# Patient Record
Sex: MALE | Race: BLACK OR AFRICAN AMERICAN | NOT HISPANIC OR LATINO | Employment: OTHER | URBAN - METROPOLITAN AREA
[De-identification: names, ages, dates, MRNs, and addresses within clinical notes are randomized per-mention and may not be internally consistent; named-entity substitution may affect disease eponyms.]

---

## 2020-01-01 ENCOUNTER — HOSPITAL ENCOUNTER (INPATIENT)
Facility: HOSPITAL | Age: 60
LOS: 6 days | DRG: 871 | End: 2020-04-16
Attending: EMERGENCY MEDICINE | Admitting: EMERGENCY MEDICINE
Payer: MEDICARE

## 2020-01-01 VITALS
SYSTOLIC BLOOD PRESSURE: 69 MMHG | TEMPERATURE: 98 F | WEIGHT: 188.25 LBS | DIASTOLIC BLOOD PRESSURE: 41 MMHG | BODY MASS INDEX: 26.95 KG/M2 | HEIGHT: 70 IN | OXYGEN SATURATION: 54 %

## 2020-01-01 DIAGNOSIS — I95.9 HYPOTENSION: ICD-10-CM

## 2020-01-01 DIAGNOSIS — I49.9 ARRHYTHMIA: ICD-10-CM

## 2020-01-01 DIAGNOSIS — U07.1 COVID-19 VIRUS INFECTION: Primary | ICD-10-CM

## 2020-01-01 DIAGNOSIS — R94.31 QT PROLONGATION: ICD-10-CM

## 2020-01-01 DIAGNOSIS — U07.1 COVID-19: ICD-10-CM

## 2020-01-01 DIAGNOSIS — R09.02 HYPOXIA: ICD-10-CM

## 2020-01-01 DIAGNOSIS — R78.81 BACTEREMIA: ICD-10-CM

## 2020-01-01 LAB
ALBUMIN SERPL BCP-MCNC: 1.2 G/DL (ref 3.5–5.2)
ALBUMIN SERPL BCP-MCNC: 1.2 G/DL (ref 3.5–5.2)
ALBUMIN SERPL BCP-MCNC: 1.5 G/DL (ref 3.5–5.2)
ALBUMIN SERPL BCP-MCNC: 1.7 G/DL (ref 3.5–5.2)
ALBUMIN SERPL BCP-MCNC: 2.2 G/DL (ref 3.5–5.2)
ALBUMIN SERPL BCP-MCNC: 2.5 G/DL (ref 3.5–5.2)
ALBUMIN SERPL BCP-MCNC: 2.9 G/DL (ref 3.5–5.2)
ALLENS TEST: ABNORMAL
ALP SERPL-CCNC: 44 U/L (ref 55–135)
ALP SERPL-CCNC: 46 U/L (ref 55–135)
ALP SERPL-CCNC: 47 U/L (ref 55–135)
ALP SERPL-CCNC: 47 U/L (ref 55–135)
ALP SERPL-CCNC: 48 U/L (ref 55–135)
ALP SERPL-CCNC: 54 U/L (ref 55–135)
ALP SERPL-CCNC: 58 U/L (ref 55–135)
ALT SERPL W/O P-5'-P-CCNC: 30 U/L (ref 10–44)
ALT SERPL W/O P-5'-P-CCNC: 31 U/L (ref 10–44)
ALT SERPL W/O P-5'-P-CCNC: 32 U/L (ref 10–44)
ALT SERPL W/O P-5'-P-CCNC: 34 U/L (ref 10–44)
ALT SERPL W/O P-5'-P-CCNC: 36 U/L (ref 10–44)
ALT SERPL W/O P-5'-P-CCNC: 40 U/L (ref 10–44)
ALT SERPL W/O P-5'-P-CCNC: 44 U/L (ref 10–44)
ANION GAP SERPL CALC-SCNC: 11 MMOL/L (ref 8–16)
ANION GAP SERPL CALC-SCNC: 11 MMOL/L (ref 8–16)
ANION GAP SERPL CALC-SCNC: 13 MMOL/L (ref 8–16)
ANION GAP SERPL CALC-SCNC: 9 MMOL/L (ref 8–16)
ANISOCYTOSIS BLD QL SMEAR: SLIGHT
AORTIC ROOT ANNULUS: 3.44 CM
AORTIC VALVE CUSP SEPERATION: 2.22 CM
ASCENDING AORTA: 1.83 CM
AST SERPL-CCNC: 20 U/L (ref 10–40)
AST SERPL-CCNC: 38 U/L (ref 10–40)
AST SERPL-CCNC: 48 U/L (ref 10–40)
AST SERPL-CCNC: 53 U/L (ref 10–40)
AST SERPL-CCNC: 70 U/L (ref 10–40)
AST SERPL-CCNC: 71 U/L (ref 10–40)
AST SERPL-CCNC: 72 U/L (ref 10–40)
AV PEAK GRADIENT: 5 MMHG
AV VELOCITY RATIO: 0.77
BACTERIA #/AREA URNS HPF: ABNORMAL /HPF
BACTERIA BLD CULT: ABNORMAL
BACTERIA BLD CULT: NORMAL
BACTERIA BLD CULT: NORMAL
BASO STIPL BLD QL SMEAR: ABNORMAL
BASOPHILS # BLD AUTO: 0.01 K/UL (ref 0–0.2)
BASOPHILS # BLD AUTO: 0.01 K/UL (ref 0–0.2)
BASOPHILS # BLD AUTO: 0.03 K/UL (ref 0–0.2)
BASOPHILS # BLD AUTO: ABNORMAL K/UL (ref 0–0.2)
BASOPHILS NFR BLD: 0 % (ref 0–1.9)
BASOPHILS NFR BLD: 0.1 % (ref 0–1.9)
BASOPHILS NFR BLD: 0.1 % (ref 0–1.9)
BASOPHILS NFR BLD: 0.3 % (ref 0–1.9)
BILIRUB SERPL-MCNC: 0.2 MG/DL (ref 0.1–1)
BILIRUB SERPL-MCNC: 0.4 MG/DL (ref 0.1–1)
BILIRUB UR QL STRIP: NEGATIVE
BNP SERPL-MCNC: 563 PG/ML (ref 0–99)
BUN SERPL-MCNC: 16 MG/DL (ref 8–23)
BUN SERPL-MCNC: 17 MG/DL (ref 8–23)
BUN SERPL-MCNC: 19 MG/DL (ref 8–23)
BUN SERPL-MCNC: 21 MG/DL (ref 8–23)
BUN SERPL-MCNC: 21 MG/DL (ref 8–23)
BUN SERPL-MCNC: 25 MG/DL (ref 8–23)
BUN SERPL-MCNC: 28 MG/DL (ref 8–23)
BUN SERPL-MCNC: 30 MG/DL (ref 8–23)
BURR CELLS BLD QL SMEAR: ABNORMAL
CA-I BLDV-SCNC: 0.89 MMOL/L (ref 1.06–1.42)
CA-I BLDV-SCNC: 0.98 MMOL/L (ref 1.06–1.42)
CA-I BLDV-SCNC: 0.98 MMOL/L (ref 1.06–1.42)
CALCIUM SERPL-MCNC: 7 MG/DL (ref 8.7–10.5)
CALCIUM SERPL-MCNC: 7.3 MG/DL (ref 8.7–10.5)
CALCIUM SERPL-MCNC: 7.5 MG/DL (ref 8.7–10.5)
CALCIUM SERPL-MCNC: 7.6 MG/DL (ref 8.7–10.5)
CALCIUM SERPL-MCNC: 7.7 MG/DL (ref 8.7–10.5)
CALCIUM SERPL-MCNC: 8.2 MG/DL (ref 8.7–10.5)
CHLORIDE SERPL-SCNC: 104 MMOL/L (ref 95–110)
CHLORIDE SERPL-SCNC: 105 MMOL/L (ref 95–110)
CHLORIDE SERPL-SCNC: 105 MMOL/L (ref 95–110)
CHLORIDE SERPL-SCNC: 106 MMOL/L (ref 95–110)
CHLORIDE SERPL-SCNC: 107 MMOL/L (ref 95–110)
CHLORIDE SERPL-SCNC: 110 MMOL/L (ref 95–110)
CK SERPL-CCNC: 267 U/L (ref 20–200)
CLARITY UR: CLEAR
CO2 SERPL-SCNC: 23 MMOL/L (ref 23–29)
CO2 SERPL-SCNC: 26 MMOL/L (ref 23–29)
CO2 SERPL-SCNC: 27 MMOL/L (ref 23–29)
CO2 SERPL-SCNC: 28 MMOL/L (ref 23–29)
COLOR UR: YELLOW
CREAT SERPL-MCNC: 0.9 MG/DL (ref 0.5–1.4)
CREAT SERPL-MCNC: 1.2 MG/DL (ref 0.5–1.4)
CREAT SERPL-MCNC: 1.3 MG/DL (ref 0.5–1.4)
CREAT SERPL-MCNC: 1.3 MG/DL (ref 0.5–1.4)
CRP SERPL-MCNC: 113.5 MG/L (ref 0–8.2)
CRP SERPL-MCNC: 294.2 MG/L (ref 0–8.2)
CRP SERPL-MCNC: 400.5 MG/L (ref 0–8.2)
CRP SERPL-MCNC: 429.9 MG/L (ref 0–8.2)
CV ECHO LV RWT: 0.39 CM
D DIMER PPP IA.FEU-MCNC: 2.21 MG/L FEU
DELSYS: ABNORMAL
DIFFERENTIAL METHOD: ABNORMAL
DOHLE BOD BLD QL SMEAR: PRESENT
DOHLE BOD BLD QL SMEAR: PRESENT
DOP CALC AO PEAK VEL: 1.15 M/S
DOP CALC LVOT AREA: 3 CM2
DOP CALC LVOT DIAMETER: 1.95 CM
DOP CALC LVOT PEAK VEL: 0.88 M/S
DOP CALC LVOT STROKE VOLUME: 62.33 CM3
DOP CALCLVOT PEAK VEL VTI: 20.88 CM
E WAVE DECELERATION TIME: 242.4 MSEC
E/A RATIO: 1.71
ECHO LV POSTERIOR WALL: 0.94 CM (ref 0.6–1.1)
EOSINOPHIL # BLD AUTO: 0 K/UL (ref 0–0.5)
EOSINOPHIL # BLD AUTO: ABNORMAL K/UL (ref 0–0.5)
EOSINOPHIL NFR BLD: 0 % (ref 0–8)
EOSINOPHIL NFR BLD: 0.2 % (ref 0–8)
EP: 10
EP: 5
ERYTHROCYTE [DISTWIDTH] IN BLOOD BY AUTOMATED COUNT: 16.3 % (ref 11.5–14.5)
ERYTHROCYTE [DISTWIDTH] IN BLOOD BY AUTOMATED COUNT: 16.4 % (ref 11.5–14.5)
ERYTHROCYTE [DISTWIDTH] IN BLOOD BY AUTOMATED COUNT: 16.5 % (ref 11.5–14.5)
ERYTHROCYTE [DISTWIDTH] IN BLOOD BY AUTOMATED COUNT: 17 % (ref 11.5–14.5)
ERYTHROCYTE [DISTWIDTH] IN BLOOD BY AUTOMATED COUNT: 17.1 % (ref 11.5–14.5)
ERYTHROCYTE [DISTWIDTH] IN BLOOD BY AUTOMATED COUNT: 17.1 % (ref 11.5–14.5)
ERYTHROCYTE [DISTWIDTH] IN BLOOD BY AUTOMATED COUNT: 17.2 % (ref 11.5–14.5)
ERYTHROCYTE [DISTWIDTH] IN BLOOD BY AUTOMATED COUNT: 17.9 % (ref 11.5–14.5)
ERYTHROCYTE [SEDIMENTATION RATE] IN BLOOD BY WESTERGREN METHOD: 12 MM/H
ERYTHROCYTE [SEDIMENTATION RATE] IN BLOOD BY WESTERGREN METHOD: 60 MM/HR (ref 0–10)
EST. GFR  (AFRICAN AMERICAN): >60 ML/MIN/1.73 M^2
EST. GFR  (NON AFRICAN AMERICAN): 58 ML/MIN/1.73 M^2
EST. GFR  (NON AFRICAN AMERICAN): 58.5 ML/MIN/1.73 M^2
EST. GFR  (NON AFRICAN AMERICAN): >60 ML/MIN/1.73 M^2
FERRITIN SERPL-MCNC: 1077 NG/ML (ref 20–300)
FERRITIN SERPL-MCNC: 1148 NG/ML (ref 20–300)
FERRITIN SERPL-MCNC: 1616 NG/ML (ref 20–300)
FERRITIN SERPL-MCNC: 940 NG/ML (ref 20–300)
FIO2: 100
FIO2: 80
FRACTIONAL SHORTENING: 27 % (ref 28–44)
GIANT PLATELETS BLD QL SMEAR: PRESENT
GLUCOSE SERPL-MCNC: 113 MG/DL (ref 70–110)
GLUCOSE SERPL-MCNC: 115 MG/DL (ref 70–110)
GLUCOSE SERPL-MCNC: 129 MG/DL (ref 70–110)
GLUCOSE SERPL-MCNC: 131 MG/DL (ref 70–110)
GLUCOSE SERPL-MCNC: 135 MG/DL (ref 70–110)
GLUCOSE SERPL-MCNC: 136 MG/DL (ref 70–110)
GLUCOSE SERPL-MCNC: 157 MG/DL (ref 70–110)
GLUCOSE SERPL-MCNC: 162 MG/DL (ref 70–110)
GLUCOSE UR QL STRIP: NEGATIVE
GLUCOSE-6-PHOSPHATE DEHYDROGENASE QUAL: NORMAL
HCO3 UR-SCNC: 26.6 MMOL/L (ref 24–28)
HCO3 UR-SCNC: 29.1 MMOL/L (ref 24–28)
HCO3 UR-SCNC: 30 MMOL/L (ref 24–28)
HCT VFR BLD AUTO: 29.7 % (ref 40–54)
HCT VFR BLD AUTO: 34.7 % (ref 40–54)
HCT VFR BLD AUTO: 35.8 % (ref 40–54)
HCT VFR BLD AUTO: 36.1 % (ref 40–54)
HCT VFR BLD AUTO: 36.6 % (ref 40–54)
HCT VFR BLD AUTO: 38.3 % (ref 40–54)
HCT VFR BLD AUTO: 38.5 % (ref 40–54)
HCT VFR BLD AUTO: 39.9 % (ref 40–54)
HGB BLD-MCNC: 10.7 G/DL (ref 14–18)
HGB BLD-MCNC: 11.2 G/DL (ref 14–18)
HGB BLD-MCNC: 11.3 G/DL (ref 14–18)
HGB BLD-MCNC: 11.4 G/DL (ref 14–18)
HGB BLD-MCNC: 11.8 G/DL (ref 14–18)
HGB BLD-MCNC: 11.9 G/DL (ref 14–18)
HGB BLD-MCNC: 12.1 G/DL (ref 14–18)
HGB BLD-MCNC: 9.3 G/DL (ref 14–18)
HGB UR QL STRIP: ABNORMAL
HYALINE CASTS #/AREA URNS LPF: 1 /LPF
HYPOCHROMIA BLD QL SMEAR: ABNORMAL
IMM GRANULOCYTES # BLD AUTO: 0.07 K/UL (ref 0–0.04)
IMM GRANULOCYTES # BLD AUTO: 0.11 K/UL (ref 0–0.04)
IMM GRANULOCYTES # BLD AUTO: 0.13 K/UL (ref 0–0.04)
IMM GRANULOCYTES # BLD AUTO: ABNORMAL K/UL (ref 0–0.04)
IMM GRANULOCYTES NFR BLD AUTO: 0.7 % (ref 0–0.5)
IMM GRANULOCYTES NFR BLD AUTO: 1.2 % (ref 0–0.5)
IMM GRANULOCYTES NFR BLD AUTO: 1.3 % (ref 0–0.5)
IMM GRANULOCYTES NFR BLD AUTO: ABNORMAL % (ref 0–0.5)
INTERVENTRICULAR SEPTUM: 1.02 CM (ref 0.6–1.1)
IP: 15
IP: 18
IVRT: 145.33 MSEC
KETONES UR QL STRIP: ABNORMAL
LA MAJOR: 4.68 CM
LA MINOR: 4.39 CM
LA WIDTH: 4.11 CM
LACTATE SERPL-SCNC: 1.4 MMOL/L (ref 0.5–2.2)
LEFT ATRIUM SIZE: 2.79 CM
LEFT ATRIUM VOLUME INDEX: 21.7 ML/M2
LEFT ATRIUM VOLUME: 44.16 CM3
LEFT INTERNAL DIMENSION IN SYSTOLE: 3.5 CM (ref 2.1–4)
LEFT VENTRICLE DIASTOLIC VOLUME INDEX: 52.41 ML/M2
LEFT VENTRICLE DIASTOLIC VOLUME: 106.62 ML
LEFT VENTRICLE MASS INDEX: 81 G/M2
LEFT VENTRICLE SYSTOLIC VOLUME INDEX: 25.1 ML/M2
LEFT VENTRICLE SYSTOLIC VOLUME: 50.99 ML
LEFT VENTRICULAR INTERNAL DIMENSION IN DIASTOLE: 4.78 CM (ref 3.5–6)
LEFT VENTRICULAR MASS: 164.48 G
LEUKOCYTE ESTERASE UR QL STRIP: NEGATIVE
LYMPHOCYTES # BLD AUTO: 0.5 K/UL (ref 1–4.8)
LYMPHOCYTES # BLD AUTO: 0.6 K/UL (ref 1–4.8)
LYMPHOCYTES # BLD AUTO: 1.2 K/UL (ref 1–4.8)
LYMPHOCYTES # BLD AUTO: ABNORMAL K/UL (ref 1–4.8)
LYMPHOCYTES NFR BLD: 0 % (ref 18–48)
LYMPHOCYTES NFR BLD: 12.3 % (ref 18–48)
LYMPHOCYTES NFR BLD: 2 % (ref 18–48)
LYMPHOCYTES NFR BLD: 3 % (ref 18–48)
LYMPHOCYTES NFR BLD: 4.3 % (ref 18–48)
LYMPHOCYTES NFR BLD: 5 % (ref 18–48)
LYMPHOCYTES NFR BLD: 6.3 % (ref 18–48)
LYMPHOCYTES NFR BLD: 8 % (ref 18–48)
MAGNESIUM SERPL-MCNC: 1.9 MG/DL (ref 1.6–2.6)
MAGNESIUM SERPL-MCNC: 2.1 MG/DL (ref 1.6–2.6)
MAGNESIUM SERPL-MCNC: 2.1 MG/DL (ref 1.6–2.6)
MAGNESIUM SERPL-MCNC: 2.3 MG/DL (ref 1.6–2.6)
MAGNESIUM SERPL-MCNC: 2.5 MG/DL (ref 1.6–2.6)
MAGNESIUM SERPL-MCNC: 2.7 MG/DL (ref 1.6–2.6)
MCH RBC QN AUTO: 25.9 PG (ref 27–31)
MCH RBC QN AUTO: 26 PG (ref 27–31)
MCH RBC QN AUTO: 26.2 PG (ref 27–31)
MCH RBC QN AUTO: 26.3 PG (ref 27–31)
MCH RBC QN AUTO: 26.3 PG (ref 27–31)
MCH RBC QN AUTO: 26.4 PG (ref 27–31)
MCH RBC QN AUTO: 26.5 PG (ref 27–31)
MCH RBC QN AUTO: 26.7 PG (ref 27–31)
MCHC RBC AUTO-ENTMCNC: 30.3 G/DL (ref 32–36)
MCHC RBC AUTO-ENTMCNC: 30.6 G/DL (ref 32–36)
MCHC RBC AUTO-ENTMCNC: 30.8 G/DL (ref 32–36)
MCHC RBC AUTO-ENTMCNC: 30.9 G/DL (ref 32–36)
MCHC RBC AUTO-ENTMCNC: 31 G/DL (ref 32–36)
MCHC RBC AUTO-ENTMCNC: 31.1 G/DL (ref 32–36)
MCHC RBC AUTO-ENTMCNC: 31.3 G/DL (ref 32–36)
MCHC RBC AUTO-ENTMCNC: 31.8 G/DL (ref 32–36)
MCV RBC AUTO: 84 FL (ref 82–98)
MCV RBC AUTO: 84 FL (ref 82–98)
MCV RBC AUTO: 85 FL (ref 82–98)
MCV RBC AUTO: 86 FL (ref 82–98)
METAMYELOCYTES NFR BLD MANUAL: 1 %
METAMYELOCYTES NFR BLD MANUAL: 1.5 %
METAMYELOCYTES NFR BLD MANUAL: 2 %
MICROSCOPIC COMMENT: ABNORMAL
MODE: ABNORMAL
MODE: ABNORMAL
MONOCYTES # BLD AUTO: 0.5 K/UL (ref 0.3–1)
MONOCYTES # BLD AUTO: 0.9 K/UL (ref 0.3–1)
MONOCYTES # BLD AUTO: 1.1 K/UL (ref 0.3–1)
MONOCYTES # BLD AUTO: ABNORMAL K/UL (ref 0.3–1)
MONOCYTES NFR BLD: 0 % (ref 4–15)
MONOCYTES NFR BLD: 0 % (ref 4–15)
MONOCYTES NFR BLD: 1 % (ref 4–15)
MONOCYTES NFR BLD: 10.4 % (ref 4–15)
MONOCYTES NFR BLD: 12 % (ref 4–15)
MONOCYTES NFR BLD: 2 % (ref 4–15)
MONOCYTES NFR BLD: 5 % (ref 4–15)
MONOCYTES NFR BLD: 5.2 % (ref 4–15)
MV PEAK A VEL: 0.52 M/S
MV PEAK E VEL: 0.89 M/S
MYELOCYTES NFR BLD MANUAL: 0.5 %
MYELOCYTES NFR BLD MANUAL: 3 %
MYELOCYTES NFR BLD MANUAL: 3 %
NEUTROPHILS # BLD AUTO: 7 K/UL (ref 1.8–7.7)
NEUTROPHILS # BLD AUTO: 7.1 K/UL (ref 1.8–7.7)
NEUTROPHILS # BLD AUTO: 9.3 K/UL (ref 1.8–7.7)
NEUTROPHILS NFR BLD: 74.7 % (ref 38–73)
NEUTROPHILS NFR BLD: 75 % (ref 38–73)
NEUTROPHILS NFR BLD: 77.5 % (ref 38–73)
NEUTROPHILS NFR BLD: 79 % (ref 38–73)
NEUTROPHILS NFR BLD: 81.9 % (ref 38–73)
NEUTROPHILS NFR BLD: 83 % (ref 38–73)
NEUTROPHILS NFR BLD: 89 % (ref 38–73)
NEUTROPHILS NFR BLD: 90 % (ref 38–73)
NEUTS BAND NFR BLD MANUAL: 17 %
NEUTS BAND NFR BLD MANUAL: 17 %
NEUTS BAND NFR BLD MANUAL: 18.5 %
NEUTS BAND NFR BLD MANUAL: 6 %
NITRITE UR QL STRIP: NEGATIVE
NRBC BLD-RTO: 0 /100 WBC
OVALOCYTES BLD QL SMEAR: ABNORMAL
PCO2 BLDA: 35.1 MMHG (ref 35–45)
PCO2 BLDA: 42 MMHG (ref 35–45)
PCO2 BLDA: 46.8 MMHG (ref 35–45)
PH SMN: 7.4 [PH] (ref 7.35–7.45)
PH SMN: 7.41 [PH] (ref 7.35–7.45)
PH SMN: 7.54 [PH] (ref 7.35–7.45)
PH UR STRIP: 5 [PH] (ref 5–8)
PHOSPHATE SERPL-MCNC: 2.8 MG/DL (ref 2.7–4.5)
PHOSPHATE SERPL-MCNC: 3.3 MG/DL (ref 2.7–4.5)
PHOSPHATE SERPL-MCNC: 3.4 MG/DL (ref 2.7–4.5)
PHOSPHATE SERPL-MCNC: 3.7 MG/DL (ref 2.7–4.5)
PHOSPHATE SERPL-MCNC: 4.6 MG/DL (ref 2.7–4.5)
PHOSPHATE SERPL-MCNC: 5.5 MG/DL (ref 2.7–4.5)
PISA TR MAX VEL: 2.28 M/S
PLATELET # BLD AUTO: 155 K/UL (ref 150–350)
PLATELET # BLD AUTO: 165 K/UL (ref 150–350)
PLATELET # BLD AUTO: 175 K/UL (ref 150–350)
PLATELET # BLD AUTO: 208 K/UL (ref 150–350)
PLATELET # BLD AUTO: 243 K/UL (ref 150–350)
PLATELET # BLD AUTO: 246 K/UL (ref 150–350)
PLATELET # BLD AUTO: 257 K/UL (ref 150–350)
PLATELET # BLD AUTO: 265 K/UL (ref 150–350)
PLATELET BLD QL SMEAR: ABNORMAL
PLATELET BLD QL SMEAR: ABNORMAL
PMV BLD AUTO: 11.7 FL (ref 9.2–12.9)
PMV BLD AUTO: 11.7 FL (ref 9.2–12.9)
PMV BLD AUTO: 11.8 FL (ref 9.2–12.9)
PMV BLD AUTO: 11.9 FL (ref 9.2–12.9)
PMV BLD AUTO: 11.9 FL (ref 9.2–12.9)
PMV BLD AUTO: 12.1 FL (ref 9.2–12.9)
PMV BLD AUTO: 12.2 FL (ref 9.2–12.9)
PMV BLD AUTO: 12.9 FL (ref 9.2–12.9)
PO2 BLDA: 44 MMHG (ref 80–100)
PO2 BLDA: 45 MMHG (ref 40–60)
PO2 BLDA: 57 MMHG (ref 80–100)
POC BE: 2 MMOL/L
POC BE: 4 MMOL/L
POC BE: 7 MMOL/L
POC SATURATED O2: 79 % (ref 95–100)
POC SATURATED O2: 86 % (ref 95–100)
POC SATURATED O2: 89 % (ref 95–100)
POC TCO2: 28 MMOL/L (ref 23–27)
POC TCO2: 31 MMOL/L (ref 23–27)
POC TCO2: 31 MMOL/L (ref 24–29)
POIKILOCYTOSIS BLD QL SMEAR: ABNORMAL
POIKILOCYTOSIS BLD QL SMEAR: SLIGHT
POLYCHROMASIA BLD QL SMEAR: ABNORMAL
POTASSIUM SERPL-SCNC: 3.9 MMOL/L (ref 3.5–5.1)
POTASSIUM SERPL-SCNC: 4 MMOL/L (ref 3.5–5.1)
POTASSIUM SERPL-SCNC: 4.1 MMOL/L (ref 3.5–5.1)
POTASSIUM SERPL-SCNC: 4.1 MMOL/L (ref 3.5–5.1)
POTASSIUM SERPL-SCNC: 4.3 MMOL/L (ref 3.5–5.1)
PROCALCITONIN SERPL IA-MCNC: 0.2 NG/ML
PROT SERPL-MCNC: 5.9 G/DL (ref 6–8.4)
PROT SERPL-MCNC: 6.3 G/DL (ref 6–8.4)
PROT SERPL-MCNC: 6.4 G/DL (ref 6–8.4)
PROT SERPL-MCNC: 6.4 G/DL (ref 6–8.4)
PROT SERPL-MCNC: 6.5 G/DL (ref 6–8.4)
PROT SERPL-MCNC: 6.9 G/DL (ref 6–8.4)
PROT SERPL-MCNC: 6.9 G/DL (ref 6–8.4)
PROT UR QL STRIP: ABNORMAL
PULM VEIN S/D RATIO: 1.54
PV PEAK D VEL: 0.26 M/S
PV PEAK S VEL: 0.4 M/S
PV PEAK VELOCITY: 0.61 CM/S
RA MAJOR: 5.11 CM
RA PRESSURE: 3 MMHG
RA WIDTH: 4.21 CM
RBC # BLD AUTO: 3.52 M/UL (ref 4.6–6.2)
RBC # BLD AUTO: 4.04 M/UL (ref 4.6–6.2)
RBC # BLD AUTO: 4.27 M/UL (ref 4.6–6.2)
RBC # BLD AUTO: 4.27 M/UL (ref 4.6–6.2)
RBC # BLD AUTO: 4.29 M/UL (ref 4.6–6.2)
RBC # BLD AUTO: 4.53 M/UL (ref 4.6–6.2)
RBC # BLD AUTO: 4.53 M/UL (ref 4.6–6.2)
RBC # BLD AUTO: 4.68 M/UL (ref 4.6–6.2)
RBC #/AREA URNS HPF: 1 /HPF (ref 0–4)
RIGHT VENTRICULAR END-DIASTOLIC DIMENSION: 4.35 CM
SAMPLE: ABNORMAL
SARS-COV-2 RDRP RESP QL NAA+PROBE: POSITIVE
SINUS: 2.53 CM
SITE: ABNORMAL
SODIUM SERPL-SCNC: 141 MMOL/L (ref 136–145)
SODIUM SERPL-SCNC: 141 MMOL/L (ref 136–145)
SODIUM SERPL-SCNC: 142 MMOL/L (ref 136–145)
SODIUM SERPL-SCNC: 142 MMOL/L (ref 136–145)
SODIUM SERPL-SCNC: 144 MMOL/L (ref 136–145)
SODIUM SERPL-SCNC: 147 MMOL/L (ref 136–145)
SP GR UR STRIP: 1.03 (ref 1–1.03)
SP02: 92
SPONT RATE: 30
SPONT RATE: 42
SQUAMOUS #/AREA URNS HPF: 3 /HPF
STJ: 2.32 CM
TOXIC GRANULES BLD QL SMEAR: PRESENT
TR MAX PG: 21 MMHG
TRICUSPID ANNULAR PLANE SYSTOLIC EXCURSION: 1.9 CM
TROPONIN I SERPL DL<=0.01 NG/ML-MCNC: 0.03 NG/ML (ref 0–0.03)
TROPONIN I SERPL DL<=0.01 NG/ML-MCNC: 0.07 NG/ML (ref 0–0.03)
TROPONIN I SERPL DL<=0.01 NG/ML-MCNC: 0.08 NG/ML (ref 0–0.03)
TV REST PULMONARY ARTERY PRESSURE: 24 MMHG
URN SPEC COLLECT METH UR: ABNORMAL
UROBILINOGEN UR STRIP-ACNC: NEGATIVE EU/DL
VANCOMYCIN TROUGH SERPL-MCNC: 6.8 UG/ML (ref 10–22)
WBC # BLD AUTO: 10.45 K/UL (ref 3.9–12.7)
WBC # BLD AUTO: 16.31 K/UL (ref 3.9–12.7)
WBC # BLD AUTO: 17.32 K/UL (ref 3.9–12.7)
WBC # BLD AUTO: 18.45 K/UL (ref 3.9–12.7)
WBC # BLD AUTO: 21.1 K/UL (ref 3.9–12.7)
WBC # BLD AUTO: 22.08 K/UL (ref 3.9–12.7)
WBC # BLD AUTO: 8.72 K/UL (ref 3.9–12.7)
WBC # BLD AUTO: 9.38 K/UL (ref 3.9–12.7)
WBC #/AREA URNS HPF: 4 /HPF (ref 0–5)
WBC TOXIC VACUOLES BLD QL SMEAR: PRESENT
WBC TOXIC VACUOLES BLD QL SMEAR: PRESENT

## 2020-01-01 PROCEDURE — 25000003 PHARM REV CODE 250: Performed by: INTERNAL MEDICINE

## 2020-01-01 PROCEDURE — A4216 STERILE WATER/SALINE, 10 ML: HCPCS | Performed by: HOSPITALIST

## 2020-01-01 PROCEDURE — 25000003 PHARM REV CODE 250: Performed by: GENERAL ACUTE CARE HOSPITAL

## 2020-01-01 PROCEDURE — 99291 CRITICAL CARE FIRST HOUR: CPT | Mod: ,,, | Performed by: INTERNAL MEDICINE

## 2020-01-01 PROCEDURE — 83880 ASSAY OF NATRIURETIC PEPTIDE: CPT

## 2020-01-01 PROCEDURE — 63600175 PHARM REV CODE 636 W HCPCS: Performed by: NURSE PRACTITIONER

## 2020-01-01 PROCEDURE — 25000003 PHARM REV CODE 250: Performed by: NURSE PRACTITIONER

## 2020-01-01 PROCEDURE — 86140 C-REACTIVE PROTEIN: CPT

## 2020-01-01 PROCEDURE — 63600175 PHARM REV CODE 636 W HCPCS: Performed by: INTERNAL MEDICINE

## 2020-01-01 PROCEDURE — 84100 ASSAY OF PHOSPHORUS: CPT

## 2020-01-01 PROCEDURE — 80053 COMPREHEN METABOLIC PANEL: CPT

## 2020-01-01 PROCEDURE — 82728 ASSAY OF FERRITIN: CPT

## 2020-01-01 PROCEDURE — 25000003 PHARM REV CODE 250: Performed by: EMERGENCY MEDICINE

## 2020-01-01 PROCEDURE — 99291 PR CRITICAL CARE, E/M 30-74 MINUTES: ICD-10-PCS | Mod: ,,, | Performed by: INTERNAL MEDICINE

## 2020-01-01 PROCEDURE — 63600175 PHARM REV CODE 636 W HCPCS: Performed by: PHYSICIAN ASSISTANT

## 2020-01-01 PROCEDURE — 94761 N-INVAS EAR/PLS OXIMETRY MLT: CPT

## 2020-01-01 PROCEDURE — 20000000 HC ICU ROOM

## 2020-01-01 PROCEDURE — 25000003 PHARM REV CODE 250: Performed by: PHYSICIAN ASSISTANT

## 2020-01-01 PROCEDURE — 99900035 HC TECH TIME PER 15 MIN (STAT)

## 2020-01-01 PROCEDURE — 27000221 HC OXYGEN, UP TO 24 HOURS

## 2020-01-01 PROCEDURE — 94660 CPAP INITIATION&MGMT: CPT

## 2020-01-01 PROCEDURE — 87040 BLOOD CULTURE FOR BACTERIA: CPT

## 2020-01-01 PROCEDURE — 36415 COLL VENOUS BLD VENIPUNCTURE: CPT

## 2020-01-01 PROCEDURE — 99292 CRITICAL CARE ADDL 30 MIN: CPT | Mod: ,,, | Performed by: NURSE PRACTITIONER

## 2020-01-01 PROCEDURE — 93005 ELECTROCARDIOGRAM TRACING: CPT

## 2020-01-01 PROCEDURE — 87040 BLOOD CULTURE FOR BACTERIA: CPT | Mod: 59

## 2020-01-01 PROCEDURE — 80053 COMPREHEN METABOLIC PANEL: CPT | Mod: 91

## 2020-01-01 PROCEDURE — 85027 COMPLETE CBC AUTOMATED: CPT | Mod: 91

## 2020-01-01 PROCEDURE — 82960 TEST FOR G6PD ENZYME: CPT

## 2020-01-01 PROCEDURE — 85027 COMPLETE CBC AUTOMATED: CPT

## 2020-01-01 PROCEDURE — 43752 NASAL/OROGASTRIC W/TUBE PLMT: CPT

## 2020-01-01 PROCEDURE — 99223 1ST HOSP IP/OBS HIGH 75: CPT | Mod: ,,, | Performed by: INTERNAL MEDICINE

## 2020-01-01 PROCEDURE — 83735 ASSAY OF MAGNESIUM: CPT

## 2020-01-01 PROCEDURE — 27100092 HC HIGH FLOW DELIVERY CANNULA

## 2020-01-01 PROCEDURE — 82550 ASSAY OF CK (CPK): CPT

## 2020-01-01 PROCEDURE — 27000190 HC CPAP FULL FACE MASK W/VALVE

## 2020-01-01 PROCEDURE — 63700000 PHARM REV CODE 250 ALT 637 W/O HCPCS: Performed by: NURSE PRACTITIONER

## 2020-01-01 PROCEDURE — 11000001 HC ACUTE MED/SURG PRIVATE ROOM

## 2020-01-01 PROCEDURE — 93010 EKG 12-LEAD: ICD-10-PCS | Mod: ,,, | Performed by: INTERNAL MEDICINE

## 2020-01-01 PROCEDURE — 85025 COMPLETE CBC W/AUTO DIFF WBC: CPT

## 2020-01-01 PROCEDURE — 84484 ASSAY OF TROPONIN QUANT: CPT

## 2020-01-01 PROCEDURE — 83735 ASSAY OF MAGNESIUM: CPT | Mod: 91

## 2020-01-01 PROCEDURE — C1751 CATH, INF, PER/CENT/MIDLINE: HCPCS

## 2020-01-01 PROCEDURE — 81000 URINALYSIS NONAUTO W/SCOPE: CPT

## 2020-01-01 PROCEDURE — 27100171 HC OXYGEN HIGH FLOW UP TO 24 HOURS

## 2020-01-01 PROCEDURE — 99233 PR SUBSEQUENT HOSPITAL CARE,LEVL III: ICD-10-PCS | Mod: ,,, | Performed by: INTERNAL MEDICINE

## 2020-01-01 PROCEDURE — 63600175 PHARM REV CODE 636 W HCPCS: Performed by: PHARMACIST

## 2020-01-01 PROCEDURE — 87186 SC STD MICRODIL/AGAR DIL: CPT

## 2020-01-01 PROCEDURE — 93010 ELECTROCARDIOGRAM REPORT: CPT | Mod: ,,, | Performed by: INTERNAL MEDICINE

## 2020-01-01 PROCEDURE — 80202 ASSAY OF VANCOMYCIN: CPT

## 2020-01-01 PROCEDURE — 84100 ASSAY OF PHOSPHORUS: CPT | Mod: 91

## 2020-01-01 PROCEDURE — 96361 HYDRATE IV INFUSION ADD-ON: CPT

## 2020-01-01 PROCEDURE — 87077 CULTURE AEROBIC IDENTIFY: CPT

## 2020-01-01 PROCEDURE — S0166 INJ OLANZAPINE 2.5MG: HCPCS | Performed by: PHYSICIAN ASSISTANT

## 2020-01-01 PROCEDURE — 96360 HYDRATION IV INFUSION INIT: CPT

## 2020-01-01 PROCEDURE — 99223 PR INITIAL HOSPITAL CARE,LEVL III: ICD-10-PCS | Mod: ,,, | Performed by: INTERNAL MEDICINE

## 2020-01-01 PROCEDURE — 25000003 PHARM REV CODE 250: Performed by: HOSPITALIST

## 2020-01-01 PROCEDURE — 63600175 PHARM REV CODE 636 W HCPCS: Mod: JG | Performed by: INTERNAL MEDICINE

## 2020-01-01 PROCEDURE — 63600175 PHARM REV CODE 636 W HCPCS: Performed by: GENERAL ACUTE CARE HOSPITAL

## 2020-01-01 PROCEDURE — 84145 PROCALCITONIN (PCT): CPT

## 2020-01-01 PROCEDURE — 82330 ASSAY OF CALCIUM: CPT

## 2020-01-01 PROCEDURE — 63600175 PHARM REV CODE 636 W HCPCS: Performed by: EMERGENCY MEDICINE

## 2020-01-01 PROCEDURE — 99285 EMERGENCY DEPT VISIT HI MDM: CPT | Mod: 25

## 2020-01-01 PROCEDURE — 82803 BLOOD GASES ANY COMBINATION: CPT

## 2020-01-01 PROCEDURE — 51702 INSERT TEMP BLADDER CATH: CPT

## 2020-01-01 PROCEDURE — 85007 BL SMEAR W/DIFF WBC COUNT: CPT

## 2020-01-01 PROCEDURE — U0002 COVID-19 LAB TEST NON-CDC: HCPCS

## 2020-01-01 PROCEDURE — 99292 PR CRITICAL CARE, ADDL 30 MIN: ICD-10-PCS | Mod: ,,, | Performed by: NURSE PRACTITIONER

## 2020-01-01 PROCEDURE — 36569 INSJ PICC 5 YR+ W/O IMAGING: CPT

## 2020-01-01 PROCEDURE — 99291 CRITICAL CARE FIRST HOUR: CPT | Mod: ,,, | Performed by: NURSE PRACTITIONER

## 2020-01-01 PROCEDURE — 85379 FIBRIN DEGRADATION QUANT: CPT

## 2020-01-01 PROCEDURE — 85652 RBC SED RATE AUTOMATED: CPT

## 2020-01-01 PROCEDURE — 83605 ASSAY OF LACTIC ACID: CPT

## 2020-01-01 PROCEDURE — 25000003 PHARM REV CODE 250: Performed by: STUDENT IN AN ORGANIZED HEALTH CARE EDUCATION/TRAINING PROGRAM

## 2020-01-01 PROCEDURE — 63700000 PHARM REV CODE 250 ALT 637 W/O HCPCS: Performed by: INTERNAL MEDICINE

## 2020-01-01 PROCEDURE — 36600 WITHDRAWAL OF ARTERIAL BLOOD: CPT

## 2020-01-01 PROCEDURE — 99291 PR CRITICAL CARE, E/M 30-74 MINUTES: ICD-10-PCS | Mod: ,,, | Performed by: NURSE PRACTITIONER

## 2020-01-01 PROCEDURE — 51701 INSERT BLADDER CATHETER: CPT

## 2020-01-01 PROCEDURE — 99233 SBSQ HOSP IP/OBS HIGH 50: CPT | Mod: ,,, | Performed by: INTERNAL MEDICINE

## 2020-01-01 PROCEDURE — 84484 ASSAY OF TROPONIN QUANT: CPT | Mod: 91

## 2020-01-01 PROCEDURE — 25000003 PHARM REV CODE 250: Performed by: PSYCHIATRY & NEUROLOGY

## 2020-01-01 PROCEDURE — 80048 BASIC METABOLIC PNL TOTAL CA: CPT

## 2020-01-01 PROCEDURE — 63700000 PHARM REV CODE 250 ALT 637 W/O HCPCS: Performed by: EMERGENCY MEDICINE

## 2020-01-01 PROCEDURE — 82800 BLOOD PH: CPT

## 2020-01-01 RX ORDER — ASPIRIN 81 MG/1
81 TABLET ORAL DAILY
Status: DISCONTINUED | OUTPATIENT
Start: 2020-01-01 | End: 2020-01-01

## 2020-01-01 RX ORDER — CHLORHEXIDINE GLUCONATE ORAL RINSE 1.2 MG/ML
15 SOLUTION DENTAL 2 TIMES DAILY
Status: DISCONTINUED | OUTPATIENT
Start: 2020-01-01 | End: 2020-01-01

## 2020-01-01 RX ORDER — AZITHROMYCIN 250 MG/1
250 TABLET, FILM COATED ORAL DAILY
Status: COMPLETED | OUTPATIENT
Start: 2020-01-01 | End: 2020-01-01

## 2020-01-01 RX ORDER — LORAZEPAM 0.5 MG/1
1 TABLET ORAL EVERY 6 HOURS PRN
COMMUNITY

## 2020-01-01 RX ORDER — POTASSIUM CHLORIDE 20 MEQ/15ML
40 SOLUTION ORAL
Status: DISCONTINUED | OUTPATIENT
Start: 2020-01-01 | End: 2020-01-01 | Stop reason: HOSPADM

## 2020-01-01 RX ORDER — AMLODIPINE BESYLATE 10 MG/1
10 TABLET ORAL
Status: ON HOLD | COMMUNITY
End: 2020-01-01

## 2020-01-01 RX ORDER — FAMOTIDINE 20 MG/1
20 TABLET, FILM COATED ORAL DAILY
COMMUNITY
Start: 2020-01-01

## 2020-01-01 RX ORDER — LANOLIN ALCOHOL/MO/W.PET/CERES
800 CREAM (GRAM) TOPICAL
Status: DISCONTINUED | OUTPATIENT
Start: 2020-01-01 | End: 2020-01-01 | Stop reason: HOSPADM

## 2020-01-01 RX ORDER — MORPHINE SULFATE 2 MG/ML
2 INJECTION, SOLUTION INTRAMUSCULAR; INTRAVENOUS ONCE
Status: COMPLETED | OUTPATIENT
Start: 2020-01-01 | End: 2020-01-01

## 2020-01-01 RX ORDER — NOREPINEPHRINE BITARTRATE/D5W 4MG/250ML
0.02 PLASTIC BAG, INJECTION (ML) INTRAVENOUS CONTINUOUS
Status: DISCONTINUED | OUTPATIENT
Start: 2020-01-01 | End: 2020-01-01

## 2020-01-01 RX ORDER — ENOXAPARIN SODIUM 100 MG/ML
40 INJECTION SUBCUTANEOUS EVERY 24 HOURS
Status: DISCONTINUED | OUTPATIENT
Start: 2020-01-01 | End: 2020-01-01

## 2020-01-01 RX ORDER — ETOMIDATE 2 MG/ML
20 INJECTION INTRAVENOUS ONCE
Status: DISCONTINUED | OUTPATIENT
Start: 2020-01-01 | End: 2020-01-01

## 2020-01-01 RX ORDER — LISINOPRIL 5 MG/1
5 TABLET ORAL DAILY
COMMUNITY
Start: 2020-01-01

## 2020-01-01 RX ORDER — PROPOFOL 10 MG/ML
5 INJECTION, EMULSION INTRAVENOUS CONTINUOUS
Status: DISCONTINUED | OUTPATIENT
Start: 2020-01-01 | End: 2020-01-01

## 2020-01-01 RX ORDER — QUETIAPINE FUMARATE 50 MG/1
50 TABLET, FILM COATED ORAL NIGHTLY
COMMUNITY

## 2020-01-01 RX ORDER — FAMOTIDINE 20 MG/1
20 TABLET, FILM COATED ORAL EVERY 12 HOURS
Status: DISCONTINUED | OUTPATIENT
Start: 2020-01-01 | End: 2020-01-01

## 2020-01-01 RX ORDER — OLANZAPINE 10 MG/2ML
5 INJECTION, POWDER, FOR SOLUTION INTRAMUSCULAR ONCE AS NEEDED
Status: COMPLETED | OUTPATIENT
Start: 2020-01-01 | End: 2020-01-01

## 2020-01-01 RX ORDER — SODIUM CHLORIDE 0.9 % (FLUSH) 0.9 %
10 SYRINGE (ML) INJECTION
Status: DISCONTINUED | OUTPATIENT
Start: 2020-01-01 | End: 2020-01-01 | Stop reason: HOSPADM

## 2020-01-01 RX ORDER — HYDROXYCHLOROQUINE SULFATE 200 MG/1
400 TABLET, FILM COATED ORAL 2 TIMES DAILY
Status: COMPLETED | OUTPATIENT
Start: 2020-01-01 | End: 2020-01-01

## 2020-01-01 RX ORDER — SODIUM,POTASSIUM PHOSPHATES 280-250MG
2 POWDER IN PACKET (EA) ORAL
Status: DISCONTINUED | OUTPATIENT
Start: 2020-01-01 | End: 2020-01-01 | Stop reason: HOSPADM

## 2020-01-01 RX ORDER — HYDROXYCHLOROQUINE SULFATE 200 MG/1
400 TABLET, FILM COATED ORAL DAILY
Status: COMPLETED | OUTPATIENT
Start: 2020-01-01 | End: 2020-01-01

## 2020-01-01 RX ORDER — MEMANTINE HYDROCHLORIDE 5 MG/1
5 TABLET ORAL DAILY
Status: DISCONTINUED | OUTPATIENT
Start: 2020-01-01 | End: 2020-01-01

## 2020-01-01 RX ORDER — FAMOTIDINE 20 MG/1
20 TABLET, FILM COATED ORAL DAILY
Status: DISCONTINUED | OUTPATIENT
Start: 2020-01-01 | End: 2020-01-01

## 2020-01-01 RX ORDER — DEXMEDETOMIDINE HYDROCHLORIDE 4 UG/ML
0.2 INJECTION, SOLUTION INTRAVENOUS CONTINUOUS
Status: DISCONTINUED | OUTPATIENT
Start: 2020-01-01 | End: 2020-01-01 | Stop reason: HOSPADM

## 2020-01-01 RX ORDER — FUROSEMIDE 10 MG/ML
40 INJECTION INTRAMUSCULAR; INTRAVENOUS ONCE
Status: COMPLETED | OUTPATIENT
Start: 2020-01-01 | End: 2020-01-01

## 2020-01-01 RX ORDER — KETOROLAC TROMETHAMINE 30 MG/ML
15 INJECTION, SOLUTION INTRAMUSCULAR; INTRAVENOUS ONCE
Status: DISCONTINUED | OUTPATIENT
Start: 2020-01-01 | End: 2020-01-01

## 2020-01-01 RX ORDER — LORAZEPAM 2 MG/ML
INJECTION INTRAMUSCULAR
Status: DISPENSED
Start: 2020-01-01 | End: 2020-01-01

## 2020-01-01 RX ORDER — DIVALPROEX SODIUM 250 MG/1
500 TABLET, DELAYED RELEASE ORAL 2 TIMES DAILY
Status: DISCONTINUED | OUTPATIENT
Start: 2020-01-01 | End: 2020-01-01

## 2020-01-01 RX ORDER — ATORVASTATIN CALCIUM 20 MG/1
40 TABLET, FILM COATED ORAL DAILY
Status: DISCONTINUED | OUTPATIENT
Start: 2020-01-01 | End: 2020-01-01

## 2020-01-01 RX ORDER — MIRTAZAPINE 15 MG/1
15 TABLET, FILM COATED ORAL NIGHTLY
Status: DISCONTINUED | OUTPATIENT
Start: 2020-01-01 | End: 2020-01-01

## 2020-01-01 RX ORDER — AZITHROMYCIN 250 MG/1
500 TABLET, FILM COATED ORAL DAILY
Status: DISCONTINUED | OUTPATIENT
Start: 2020-01-01 | End: 2020-01-01

## 2020-01-01 RX ORDER — AMLODIPINE BESYLATE 5 MG/1
5 TABLET ORAL 2 TIMES DAILY
Status: DISCONTINUED | OUTPATIENT
Start: 2020-01-01 | End: 2020-01-01

## 2020-01-01 RX ORDER — MORPHINE SULFATE 1 MG/ML
2 INJECTION, SOLUTION INTRAVENOUS CONTINUOUS
Status: DISCONTINUED | OUTPATIENT
Start: 2020-01-01 | End: 2020-01-01 | Stop reason: HOSPADM

## 2020-01-01 RX ORDER — SUCCINYLCHOLINE CHLORIDE 20 MG/ML
1.5 INJECTION INTRAMUSCULAR; INTRAVENOUS ONCE
Status: DISCONTINUED | OUTPATIENT
Start: 2020-01-01 | End: 2020-01-01

## 2020-01-01 RX ORDER — LISINOPRIL 5 MG/1
5 TABLET ORAL DAILY
Status: DISCONTINUED | OUTPATIENT
Start: 2020-01-01 | End: 2020-01-01

## 2020-01-01 RX ORDER — HYDRALAZINE HYDROCHLORIDE 20 MG/ML
10 INJECTION INTRAMUSCULAR; INTRAVENOUS EVERY 4 HOURS PRN
Status: DISCONTINUED | OUTPATIENT
Start: 2020-01-01 | End: 2020-01-01

## 2020-01-01 RX ORDER — DIVALPROEX SODIUM 500 MG/1
500 TABLET, DELAYED RELEASE ORAL 2 TIMES DAILY
COMMUNITY
Start: 2020-01-01

## 2020-01-01 RX ORDER — ACETAMINOPHEN 650 MG/1
650 SUPPOSITORY RECTAL
Status: COMPLETED | OUTPATIENT
Start: 2020-01-01 | End: 2020-01-01

## 2020-01-01 RX ORDER — ACETAMINOPHEN 325 MG/1
650 TABLET ORAL EVERY 6 HOURS PRN
Status: DISCONTINUED | OUTPATIENT
Start: 2020-01-01 | End: 2020-01-01 | Stop reason: HOSPADM

## 2020-01-01 RX ORDER — QUETIAPINE FUMARATE 25 MG/1
50 TABLET, FILM COATED ORAL NIGHTLY
Status: DISCONTINUED | OUTPATIENT
Start: 2020-01-01 | End: 2020-01-01

## 2020-01-01 RX ORDER — LORAZEPAM 2 MG/ML
1 INJECTION INTRAMUSCULAR
Status: DISCONTINUED | OUTPATIENT
Start: 2020-01-01 | End: 2020-01-01 | Stop reason: HOSPADM

## 2020-01-01 RX ORDER — PROPOFOL 10 MG/ML
INJECTION, EMULSION INTRAVENOUS
Status: DISPENSED
Start: 2020-01-01 | End: 2020-01-01

## 2020-01-01 RX ORDER — ACETAMINOPHEN 500 MG
1000 TABLET ORAL
Status: ACTIVE | OUTPATIENT
Start: 2020-01-01 | End: 2020-01-01

## 2020-01-01 RX ORDER — LORAZEPAM 0.5 MG/1
1 TABLET ORAL EVERY 6 HOURS PRN
Status: DISCONTINUED | OUTPATIENT
Start: 2020-01-01 | End: 2020-01-01

## 2020-01-01 RX ORDER — ONDANSETRON 2 MG/ML
4 INJECTION INTRAMUSCULAR; INTRAVENOUS EVERY 8 HOURS PRN
Status: DISCONTINUED | OUTPATIENT
Start: 2020-01-01 | End: 2020-01-01 | Stop reason: HOSPADM

## 2020-01-01 RX ORDER — POLYETHYLENE GLYCOL 3350 17 G/17G
17 POWDER, FOR SOLUTION ORAL DAILY
Status: DISCONTINUED | OUTPATIENT
Start: 2020-01-01 | End: 2020-01-01

## 2020-01-01 RX ORDER — MEMANTINE HYDROCHLORIDE 10 MG/1
5 TABLET ORAL DAILY
COMMUNITY
Start: 2020-01-01

## 2020-01-01 RX ORDER — FUROSEMIDE 10 MG/ML
60 INJECTION INTRAMUSCULAR; INTRAVENOUS ONCE
Status: COMPLETED | OUTPATIENT
Start: 2020-01-01 | End: 2020-01-01

## 2020-01-01 RX ORDER — SODIUM CHLORIDE 0.9 % (FLUSH) 0.9 %
10 SYRINGE (ML) INJECTION EVERY 6 HOURS
Status: DISCONTINUED | OUTPATIENT
Start: 2020-01-01 | End: 2020-01-01 | Stop reason: HOSPADM

## 2020-01-01 RX ORDER — TRAZODONE HYDROCHLORIDE 100 MG/1
100 TABLET ORAL DAILY
COMMUNITY

## 2020-01-01 RX ORDER — DOCUSATE SODIUM 100 MG/1
100 CAPSULE, LIQUID FILLED ORAL 2 TIMES DAILY
Status: DISCONTINUED | OUTPATIENT
Start: 2020-01-01 | End: 2020-01-01

## 2020-01-01 RX ORDER — NAPROXEN SODIUM 220 MG/1
81 TABLET, FILM COATED ORAL DAILY
Status: DISCONTINUED | OUTPATIENT
Start: 2020-01-01 | End: 2020-01-01

## 2020-01-01 RX ORDER — DEXMEDETOMIDINE HYDROCHLORIDE 4 UG/ML
0.2 INJECTION, SOLUTION INTRAVENOUS CONTINUOUS
Status: DISCONTINUED | OUTPATIENT
Start: 2020-01-01 | End: 2020-01-01

## 2020-01-01 RX ORDER — CHLORHEXIDINE GLUCONATE ORAL RINSE 1.2 MG/ML
15 SOLUTION DENTAL 2 TIMES DAILY
Status: DISCONTINUED | OUTPATIENT
Start: 2020-01-01 | End: 2020-01-01 | Stop reason: HOSPADM

## 2020-01-01 RX ORDER — MORPHINE SULFATE 2 MG/ML
2 INJECTION, SOLUTION INTRAMUSCULAR; INTRAVENOUS
Status: DISCONTINUED | OUTPATIENT
Start: 2020-01-01 | End: 2020-01-01 | Stop reason: HOSPADM

## 2020-01-01 RX ORDER — TRAZODONE HYDROCHLORIDE 100 MG/1
100 TABLET ORAL DAILY
Status: DISCONTINUED | OUTPATIENT
Start: 2020-01-01 | End: 2020-01-01

## 2020-01-01 RX ORDER — LORAZEPAM 2 MG/ML
1 INJECTION INTRAMUSCULAR EVERY 30 MIN PRN
Status: DISCONTINUED | OUTPATIENT
Start: 2020-01-01 | End: 2020-01-01 | Stop reason: HOSPADM

## 2020-01-01 RX ORDER — DONEPEZIL HYDROCHLORIDE 5 MG/1
10 TABLET, FILM COATED ORAL NIGHTLY
Status: DISCONTINUED | OUTPATIENT
Start: 2020-01-01 | End: 2020-01-01

## 2020-01-01 RX ADMIN — CHLORHEXIDINE GLUCONATE 0.12% ORAL RINSE 15 ML: 1.2 LIQUID ORAL at 08:04

## 2020-01-01 RX ADMIN — VALPROATE SODIUM 250 MG: 100 INJECTION, SOLUTION INTRAVENOUS at 03:04

## 2020-01-01 RX ADMIN — QUETIAPINE FUMARATE 50 MG: 25 TABLET, FILM COATED ORAL at 08:04

## 2020-01-01 RX ADMIN — DEXMEDETOMIDINE HYDROCHLORIDE 0.6 MCG/KG/HR: 4 INJECTION, SOLUTION INTRAVENOUS at 07:04

## 2020-01-01 RX ADMIN — VANCOMYCIN HYDROCHLORIDE 1250 MG: 1.25 INJECTION, POWDER, LYOPHILIZED, FOR SOLUTION INTRAVENOUS at 12:04

## 2020-01-01 RX ADMIN — AZITHROMYCIN 250 MG: 250 TABLET, FILM COATED ORAL at 08:04

## 2020-01-01 RX ADMIN — Medication 10 ML: at 05:04

## 2020-01-01 RX ADMIN — MEMANTINE HYDROCHLORIDE 5 MG: 5 TABLET ORAL at 08:04

## 2020-01-01 RX ADMIN — CHLORHEXIDINE GLUCONATE 0.12% ORAL RINSE 15 ML: 1.2 LIQUID ORAL at 09:04

## 2020-01-01 RX ADMIN — DEXMEDETOMIDINE HYDROCHLORIDE 1.2 MCG/KG/HR: 4 INJECTION, SOLUTION INTRAVENOUS at 07:04

## 2020-01-01 RX ADMIN — SODIUM CHLORIDE 1905 ML: 0.9 INJECTION, SOLUTION INTRAVENOUS at 02:04

## 2020-01-01 RX ADMIN — FAMOTIDINE 20 MG: 20 TABLET, FILM COATED ORAL at 08:04

## 2020-01-01 RX ADMIN — FUROSEMIDE 40 MG: 10 INJECTION, SOLUTION INTRAMUSCULAR; INTRAVENOUS at 10:04

## 2020-01-01 RX ADMIN — DEXMEDETOMIDINE HYDROCHLORIDE 1 MCG/KG/HR: 4 INJECTION, SOLUTION INTRAVENOUS at 02:04

## 2020-01-01 RX ADMIN — DEXMEDETOMIDINE HYDROCHLORIDE 1.4 MCG/KG/HR: 4 INJECTION, SOLUTION INTRAVENOUS at 06:04

## 2020-01-01 RX ADMIN — DEXMEDETOMIDINE HYDROCHLORIDE 0.2 MCG/KG/HR: 4 INJECTION, SOLUTION INTRAVENOUS at 09:04

## 2020-01-01 RX ADMIN — DEXMEDETOMIDINE HYDROCHLORIDE 1.2 MCG/KG/HR: 4 INJECTION, SOLUTION INTRAVENOUS at 12:04

## 2020-01-01 RX ADMIN — TRAZODONE HYDROCHLORIDE 100 MG: 50 TABLET ORAL at 12:04

## 2020-01-01 RX ADMIN — ACETAMINOPHEN 650 MG: 325 TABLET ORAL at 09:04

## 2020-01-01 RX ADMIN — ACETAMINOPHEN 650 MG: 325 TABLET ORAL at 08:04

## 2020-01-01 RX ADMIN — TRAZODONE HYDROCHLORIDE 100 MG: 50 TABLET ORAL at 08:04

## 2020-01-01 RX ADMIN — DIVALPROEX SODIUM 500 MG: 250 TABLET, DELAYED RELEASE ORAL at 08:04

## 2020-01-01 RX ADMIN — FAMOTIDINE 20 MG: 20 TABLET, FILM COATED ORAL at 09:04

## 2020-01-01 RX ADMIN — DEXMEDETOMIDINE HYDROCHLORIDE 1 MCG/KG/HR: 4 INJECTION, SOLUTION INTRAVENOUS at 05:04

## 2020-01-01 RX ADMIN — QUETIAPINE FUMARATE 50 MG: 25 TABLET, FILM COATED ORAL at 09:04

## 2020-01-01 RX ADMIN — AZITHROMYCIN MONOHYDRATE 500 MG: 250 TABLET ORAL at 08:04

## 2020-01-01 RX ADMIN — MIRTAZAPINE 15 MG: 15 TABLET, FILM COATED ORAL at 08:04

## 2020-01-01 RX ADMIN — ENOXAPARIN SODIUM 40 MG: 100 INJECTION SUBCUTANEOUS at 04:04

## 2020-01-01 RX ADMIN — MORPHINE SULFATE 2 MG: 2 INJECTION, SOLUTION INTRAMUSCULAR; INTRAVENOUS at 10:04

## 2020-01-01 RX ADMIN — VALPROATE SODIUM 500 MG: 100 INJECTION, SOLUTION INTRAVENOUS at 09:04

## 2020-01-01 RX ADMIN — Medication 10 ML: at 12:04

## 2020-01-01 RX ADMIN — VANCOMYCIN HYDROCHLORIDE 1750 MG: 1 INJECTION, POWDER, LYOPHILIZED, FOR SOLUTION INTRAVENOUS at 02:04

## 2020-01-01 RX ADMIN — Medication 0.08 MCG/KG/MIN: at 12:04

## 2020-01-01 RX ADMIN — ACETAMINOPHEN 650 MG: 325 TABLET ORAL at 02:04

## 2020-01-01 RX ADMIN — FAMOTIDINE 20 MG: 20 TABLET, FILM COATED ORAL at 12:04

## 2020-01-01 RX ADMIN — ASPIRIN 81 MG CHEWABLE TABLET 81 MG: 81 TABLET CHEWABLE at 08:04

## 2020-01-01 RX ADMIN — POLYETHYLENE GLYCOL 3350 17 G: 17 POWDER, FOR SOLUTION ORAL at 08:04

## 2020-01-01 RX ADMIN — Medication 0.08 MCG/KG/MIN: at 08:04

## 2020-01-01 RX ADMIN — CEFTRIAXONE SODIUM 2 G: 2 INJECTION, SOLUTION INTRAVENOUS at 05:04

## 2020-01-01 RX ADMIN — ATORVASTATIN CALCIUM 40 MG: 40 TABLET, FILM COATED ORAL at 08:04

## 2020-01-01 RX ADMIN — Medication 10 ML: at 06:04

## 2020-01-01 RX ADMIN — OLANZAPINE 5 MG: 10 INJECTION, POWDER, FOR SOLUTION INTRAMUSCULAR at 03:04

## 2020-01-01 RX ADMIN — Medication 0.08 MCG/KG/MIN: at 06:04

## 2020-01-01 RX ADMIN — CEFTRIAXONE 2 G: 2 INJECTION, SOLUTION INTRAVENOUS at 05:04

## 2020-01-01 RX ADMIN — DOCUSATE SODIUM 100 MG: 100 CAPSULE, LIQUID FILLED ORAL at 08:04

## 2020-01-01 RX ADMIN — HYDROXYCHLOROQUINE SULFATE 400 MG: 200 TABLET, FILM COATED ORAL at 01:04

## 2020-01-01 RX ADMIN — LORAZEPAM 1 MG: 0.5 TABLET ORAL at 09:04

## 2020-01-01 RX ADMIN — DEXMEDETOMIDINE HYDROCHLORIDE 1 MCG/KG/HR: 4 INJECTION, SOLUTION INTRAVENOUS at 06:04

## 2020-01-01 RX ADMIN — VALPROATE SODIUM 250 MG: 100 INJECTION, SOLUTION INTRAVENOUS at 11:04

## 2020-01-01 RX ADMIN — DEXMEDETOMIDINE HYDROCHLORIDE 1 MCG/KG/HR: 4 INJECTION, SOLUTION INTRAVENOUS at 09:04

## 2020-01-01 RX ADMIN — LORAZEPAM 1 MG: 0.5 TABLET ORAL at 08:04

## 2020-01-01 RX ADMIN — LORAZEPAM 1 MG: 2 INJECTION INTRAMUSCULAR; INTRAVENOUS at 01:04

## 2020-01-01 RX ADMIN — CEFTRIAXONE 1 G: 1 INJECTION, SOLUTION INTRAVENOUS at 04:04

## 2020-01-01 RX ADMIN — HYDROXYCHLOROQUINE SULFATE 400 MG: 200 TABLET, FILM COATED ORAL at 08:04

## 2020-01-01 RX ADMIN — VALPROATE SODIUM 250 MG: 100 INJECTION, SOLUTION INTRAVENOUS at 01:04

## 2020-01-01 RX ADMIN — VANCOMYCIN HYDROCHLORIDE 1500 MG: 1.5 INJECTION, POWDER, LYOPHILIZED, FOR SOLUTION INTRAVENOUS at 12:04

## 2020-01-01 RX ADMIN — DEXMEDETOMIDINE HYDROCHLORIDE 0.2 MCG/KG/HR: 4 INJECTION, SOLUTION INTRAVENOUS at 10:04

## 2020-01-01 RX ADMIN — MORPHINE SULFATE 2 MG: 2 INJECTION, SOLUTION INTRAMUSCULAR; INTRAVENOUS at 12:04

## 2020-01-01 RX ADMIN — Medication 10 ML: at 07:04

## 2020-01-01 RX ADMIN — AMLODIPINE BESYLATE 5 MG: 5 TABLET ORAL at 08:04

## 2020-01-01 RX ADMIN — FUROSEMIDE 40 MG: 10 INJECTION, SOLUTION INTRAMUSCULAR; INTRAVENOUS at 05:04

## 2020-01-01 RX ADMIN — VALPROATE SODIUM 250 MG: 100 INJECTION, SOLUTION INTRAVENOUS at 05:04

## 2020-01-01 RX ADMIN — ENOXAPARIN SODIUM 40 MG: 100 INJECTION SUBCUTANEOUS at 05:04

## 2020-01-01 RX ADMIN — DEXMEDETOMIDINE HYDROCHLORIDE 0.6 MCG/KG/HR: 4 INJECTION, SOLUTION INTRAVENOUS at 08:04

## 2020-01-01 RX ADMIN — DEXMEDETOMIDINE HYDROCHLORIDE 1.2 MCG/KG/HR: 4 INJECTION, SOLUTION INTRAVENOUS at 01:04

## 2020-01-01 RX ADMIN — Medication 0.16 MCG/KG/MIN: at 06:04

## 2020-01-01 RX ADMIN — ASPIRIN 81 MG: 81 TABLET ORAL at 08:04

## 2020-01-01 RX ADMIN — ENOXAPARIN SODIUM 40 MG: 100 INJECTION SUBCUTANEOUS at 06:04

## 2020-01-01 RX ADMIN — LISINOPRIL 5 MG: 5 TABLET ORAL at 08:04

## 2020-01-01 RX ADMIN — Medication 1 MG/HR: at 03:04

## 2020-01-01 RX ADMIN — CALCIUM GLUCONATE 2 G: 98 INJECTION, SOLUTION INTRAVENOUS at 10:04

## 2020-01-01 RX ADMIN — ACETAMINOPHEN 650 MG: 325 TABLET ORAL at 04:04

## 2020-01-01 RX ADMIN — ACETAMINOPHEN 650 MG: 650 SUPPOSITORY RECTAL at 02:04

## 2020-01-01 RX ADMIN — VALPROATE SODIUM 250 MG: 100 INJECTION, SOLUTION INTRAVENOUS at 06:04

## 2020-01-01 RX ADMIN — DIVALPROEX SODIUM 500 MG: 250 TABLET, DELAYED RELEASE ORAL at 09:04

## 2020-01-01 RX ADMIN — DEXMEDETOMIDINE HYDROCHLORIDE 0.2 MCG/KG/HR: 4 INJECTION, SOLUTION INTRAVENOUS at 05:04

## 2020-01-01 RX ADMIN — DEXMEDETOMIDINE HYDROCHLORIDE 0.2 MCG/KG/HR: 4 INJECTION, SOLUTION INTRAVENOUS at 12:04

## 2020-01-01 RX ADMIN — CEFTRIAXONE 2 G: 2 INJECTION, SOLUTION INTRAVENOUS at 04:04

## 2020-01-01 RX ADMIN — Medication 10 ML: at 11:04

## 2020-01-01 RX ADMIN — MORPHINE SULFATE 2 MG: 2 INJECTION, SOLUTION INTRAMUSCULAR; INTRAVENOUS at 01:04

## 2020-01-01 RX ADMIN — FUROSEMIDE 60 MG: 10 INJECTION, SOLUTION INTRAMUSCULAR; INTRAVENOUS at 11:04

## 2020-01-01 RX ADMIN — CEFTRIAXONE 2 G: 2 INJECTION, SOLUTION INTRAVENOUS at 08:04

## 2020-01-01 RX ADMIN — DONEPEZIL HYDROCHLORIDE 10 MG: 10 TABLET, FILM COATED ORAL at 08:04

## 2020-01-01 RX ADMIN — DEXMEDETOMIDINE HYDROCHLORIDE 0.2 MCG/KG/HR: 4 INJECTION, SOLUTION INTRAVENOUS at 06:04

## 2020-01-01 RX ADMIN — DEXMEDETOMIDINE HYDROCHLORIDE 0.2 MCG/KG/HR: 4 INJECTION, SOLUTION INTRAVENOUS at 08:04

## 2020-01-01 RX ADMIN — CEFTRIAXONE 1 G: 1 INJECTION, SOLUTION INTRAVENOUS at 05:04

## 2020-01-01 RX ADMIN — VALPROATE SODIUM 250 MG: 100 INJECTION, SOLUTION INTRAVENOUS at 09:04

## 2020-01-01 RX ADMIN — ALTEPLASE 2 MG: 2.2 INJECTION, POWDER, LYOPHILIZED, FOR SOLUTION INTRAVENOUS at 01:04

## 2020-01-01 RX ADMIN — AZITHROMYCIN 250 MG: 250 TABLET, FILM COATED ORAL at 01:04

## 2020-01-01 RX ADMIN — MEMANTINE HYDROCHLORIDE 5 MG: 5 TABLET ORAL at 12:04

## 2020-01-01 RX ADMIN — VALPROATE SODIUM 250 MG: 100 INJECTION, SOLUTION INTRAVENOUS at 10:04

## 2020-01-01 RX ADMIN — DEXMEDETOMIDINE HYDROCHLORIDE 0.5 MCG/KG/HR: 4 INJECTION, SOLUTION INTRAVENOUS at 02:04

## 2020-01-01 RX ADMIN — LORAZEPAM 1 MG: 0.5 TABLET ORAL at 02:04

## 2020-01-01 RX ADMIN — VALPROATE SODIUM 250 MG: 100 INJECTION, SOLUTION INTRAVENOUS at 02:04

## 2020-01-01 RX ADMIN — POLYETHYLENE GLYCOL 3350 17 G: 17 POWDER, FOR SOLUTION ORAL at 09:04

## 2020-01-01 RX ADMIN — Medication 0.02 MCG/KG/MIN: at 09:04

## 2020-01-01 RX ADMIN — VANCOMYCIN HYDROCHLORIDE 1750 MG: 1 INJECTION, POWDER, LYOPHILIZED, FOR SOLUTION INTRAVENOUS at 12:04

## 2020-04-10 PROBLEM — E78.5 HYPERLIPIDEMIA: Status: ACTIVE | Noted: 2020-01-01

## 2020-04-10 PROBLEM — B18.2 CHRONIC HEPATITIS C: Status: ACTIVE | Noted: 2020-01-01

## 2020-04-10 PROBLEM — U07.1 COVID-19 VIRUS INFECTION: Status: ACTIVE | Noted: 2020-01-01

## 2020-04-10 NOTE — NURSING
Venti mask in place. 02 at 90%. Patient asleep but arousable. Soft restraints maintained. Bed in low position. Alarm on. Siderails up x 3.

## 2020-04-10 NOTE — HPI
Bossman Wheeler is a 60 y.o. male with pmh of Hep C, Hep B, HTN, Dementia, and TBI who presented to [facility] from [facility] in [place] with cc of fever. Tmax at facility was 101.4 which only decreased to 101.1 with tylenol. He was found with O2 saturation=88% on RA, which stabilized in the ED with O2 via NC. COVID-19 POSITIVE. Normal WBC. Mildly elevated troponin with sinus tach on EKG. Rocephin and Azithromycin started in ED for CAP coverage. Admitted for supportive care.

## 2020-04-10 NOTE — CARE UPDATE
Patient found with increased agitation. Pulled off oxygen and tele monitoring. Large bm, soiling the bed. 5mg Zyprexa IM given. Will place in 2 point soft restraints to allow for continuous oxygen and tele-monitoring, including continuous pulse ox.     Discussed with Dr. Jocelyne Simons, PA-C  [facility]  Department of Neurosurgery  [phone]

## 2020-04-10 NOTE — NURSING
Toradol order held per MD order. Patient with no verbal or signs of pain. Last temp 99.6. Will continue to monitor.

## 2020-04-10 NOTE — HOSPITAL COURSE
Bossman Wheeler is a 60 y.o. male with pmh of Hep C, Hep B, HTN, Dementia, and TBI who presented to [facility] ED from [facility] in [place] with cc of fever. Tmax at facility was 101.4 which only decreased to 101.1 with tylenol. He was found with O2 saturation=88% on RA, which stabilized in the ED with O2 via NC. COVID-19 POSITIVE. Normal WBC. Mildly elevated troponin with sinus tach on EKG. Rocephin and Azithromycin started in ED for CAP coverage. Admitted for supportive care. Now stable at 95% O2 saturation on 4L NC. Oriented to person only. Tmax since wubfpzmnd=639.7. Home meds reconciled.  The patient became very agitated on the floor requiring restraints, Zyprexa and Ativan. On 4/11, the patient declined from a respiratory standpoint with hypoxia despite being on a 100% mask. The patient was moved to the ICu on 4/11. The patient grew out Strep. Viridans on blood cultures from 4/10. Patient was started on Vancomycin on 4/11 and repeat blood cultures x2 and echo was ordered.

## 2020-04-10 NOTE — NURSING
Patient in room with . Attempting to get out of bed. Patient found walking around room. Tele monitor disconnected, pulse ox disconnected. No gown in place. Patient safely seated on side of bed. Attempted redirection. Unable to orient patient. Switched to venit mask at 15%. Patient not cooperative to wear. Unable to secure tele monitor, facemask, NC, or pulse ox. VALERIA Cline in room. Order for soft nonviolent restraints and Zyprex IM x 1 noted. Zyprexa given per order. Patient cleaned, comfortable in bed. Soft wrist restraints placed. Venti mask in place. Bed in low position. Side rails up x 3. Bed alarm on. Will continue to monitor.

## 2020-04-10 NOTE — SUBJECTIVE & OBJECTIVE
Review of Systems   Oriented to person only. Limited ROS  Denies SOB    Objective:     Vital Signs (Most Recent):  Temp: (!) 100.8 °F (38.2 °C) (04/10/20 1110)  Pulse: 85 (04/10/20 1133)  Resp: (!) 26 (04/10/20 1110)  BP: 96/60 (04/10/20 1110)  SpO2: 97 % (04/10/20 1133) Vital Signs (24h Range):  Temp:  [99.8 °F (37.7 °C)-102.7 °F (39.3 °C)] 100.8 °F (38.2 °C)  Pulse:  [] 85  Resp:  [16-38] 26  SpO2:  [86 %-97 %] 97 %  BP: ()/(55-74) 96/60     Weight: 63.5 kg (140 lb)  Body mass index is 21.93 kg/m².    Intake/Output Summary (Last 24 hours) at 4/10/2020 1252  Last data filed at 4/10/2020 0508  Gross per 24 hour   Intake 1905 ml   Output --   Net 1905 ml      Physical Exam   General: well developed, well nourished, no distress  Neurologic: Alert and oriented. Thought content appropriate.  GCS: Motor: 6/Verbal: 5/Eyes: 4 GCS Total: 15  Cranial nerves: II-XII grossly intact  Neck: supple, without obvious masses or lesions  Skin: grossly intact in all 4 extremities without obvious rashes or lesions  Respiratory: even, non-labored breathing, 4L NC, 93% oxygen saturation      Significant Labs:   Blood Culture:   Recent Labs   Lab 04/10/20  0237   LABBLOO No Growth to date     CBC:   Recent Labs   Lab 04/10/20  0239   WBC 9.38   HGB 10.7*   HCT 34.7*        CMP:   Recent Labs   Lab 04/10/20  0239      K 3.9      CO2 27   *   BUN 19   CREATININE 1.2   CALCIUM 8.2*   PROT 6.9   ALBUMIN 2.9*   BILITOT 0.4   ALKPHOS 54*   AST 20   ALT 32   ANIONGAP 9   EGFRNONAA >60     Cardiac Markers: No results for input(s): CKMB, MYOGLOBIN, BNP, TROPISTAT in the last 48 hours.  Lactic Acid:   Recent Labs   Lab 04/10/20  0239   LACTATE 1.4     Magnesium:   Recent Labs   Lab 04/10/20  0239   MG 1.9     Troponin:   Recent Labs   Lab 04/10/20  0239   TROPONINI 0.028*     COVID-19: POSITIVE  CRP: 113  PRO-CALCITONIN: 0.2  PHOSPHORUS: 2.8    Significant Imaging:   CXR 4/10  Diffuse increased parenchymal  attenuation with patchy ground-glass opacity suggestive of underlying infectious process (such as viral pneumonia) in the appropriate clinical setting with additional differential considerations including pulmonary edema or non infectious inflammatory process.    EKG 4/10:   Initial Reading: No STEMI. Rhythm: Sinus Tachycardia. Heart Rate: 101. Ectopy: No Ectopy. Conduction: Normal. ST Segments: Normal ST Segments. T Waves: Normal. Axis: Normal. Clinical Impression: Sinus Tachycardia

## 2020-04-10 NOTE — PLAN OF CARE
"SW spoke with patient's wife, Rose Mary  to complete discharge planning assessment. Prior to beginning the discharge planning assessment, wife verified patient's name and date of birth. SW educated patient on the discharge process and explained that discharge planning begins at admission. SW reviewed  "Help at home", "Managing your health", and "Your preferences". Rose Mary stated that patient lived at [facility] but can't return. Wife stated that patient was kicked out of nursing home because he refused to take a shower. Wife stated that patient was sent to [facility] from [facility] and stated that patient was going to be placed in a new nursing home.     SW contacted [facility] and spoke with Carlos, admissions coordinator who confirmed that patient was a resident but is no longer allowed to return due to behavior. Carlos stated that patient becomes very aggressive when it's time to shower and is sexually inappropriate. Carlos informed SW that patient was sent to [facility] March 17 and that [facility] is supposed to be finding placement for patient.     AMRIK contacted [facility] discharge planning department but was unable to leave a message. AMRIK will call Monday.      04/10/20 2549   Discharge Assessment   Assessment Type Discharge Planning Assessment   Confirmed/corrected address and phone number on facesheet? Yes   Assessment information obtained from? Other   Prior to hospitilization cognitive status: Alert/Oriented   Prior to hospitalization functional status: Independent   Current cognitive status: Alert/Oriented   Current Functional Status: Independent   Facility Arrived From: [facility]   Lives With facility resident   Able to Return to Prior Arrangements no   Is patient able to care for self after discharge? No   Patient's perception of discharge disposition home or selfcare   Readmission Within the Last 30 Days no previous admission in last 30 days   Patient currently " being followed by outpatient case management? No   Patient currently receives any other outside agency services? No   Equipment Currently Used at Home none   Do you have any problems affording any of your prescribed medications? No   Is the patient taking medications as prescribed? yes   Does the patient have transportation home? Yes   Transportation Anticipated health plan transportation   Does the patient receive services at the [facility]? No   Discharge Plan A New Nursing Home placement - [facility] care facility   Discharge Plan B Other  (inpatient psyc)   DME Needed Upon Discharge  none   Patient/Family in Agreement with Plan yes

## 2020-04-10 NOTE — H&P
[facility]  Neurosurgery  Progress Note    Subjective:     History of Present Illness: Bossman Wheeler is a 60 y.o. male with pmh of Hep C, Hep B, HTN, Dementia, and TBI who presented to [facility] from [facility] in [place] with cc of fever. Tmax at facility was 101.4 which only decreased to 101.1 with tylenol. He was found with O2 saturation=88% on RA, which stabilized in the ED with O2 via NC. COVID-19 POSITIVE. Normal WBC. Mildly elevated troponin with sinus tach on EKG. Rocephin and Azithromycin started in ED for CAP coverage. Admitted for supportive care.           Review of Systems   Oriented to person only. Limited ROS  Denies SOB    Objective:     Vital Signs (Most Recent):  Temp: (!) 100.8 °F (38.2 °C) (04/10/20 1110)  Pulse: 85 (04/10/20 1133)  Resp: (!) 26 (04/10/20 1110)  BP: 96/60 (04/10/20 1110)  SpO2: 97 % (04/10/20 1133) Vital Signs (24h Range):  Temp:  [99.8 °F (37.7 °C)-102.7 °F (39.3 °C)] 100.8 °F (38.2 °C)  Pulse:  [] 85  Resp:  [16-38] 26  SpO2:  [86 %-97 %] 97 %  BP: ()/(55-74) 96/60     Weight: 63.5 kg (140 lb)  Body mass index is 21.93 kg/m².    Intake/Output Summary (Last 24 hours) at 4/10/2020 1252  Last data filed at 4/10/2020 0508  Gross per 24 hour   Intake 1905 ml   Output --   Net 1905 ml      Physical Exam   General: well developed, well nourished, no distress  Neurologic: Alert and oriented. Thought content appropriate.  GCS: Motor: 6/Verbal: 5/Eyes: 4 GCS Total: 15  Cranial nerves: II-XII grossly intact  Neck: supple, without obvious masses or lesions  Skin: grossly intact in all 4 extremities without obvious rashes or lesions  Respiratory: even, non-labored breathing, 4L NC, 93% oxygen saturation      Significant Labs:   Blood Culture:   Recent Labs   Lab 04/10/20  0237   LABBLOO No Growth to date     CBC:   Recent Labs   Lab 04/10/20  0239   WBC 9.38   HGB 10.7*   HCT 34.7*        CMP:   Recent Labs   Lab 04/10/20  0239       K 3.9      CO2 27   *   BUN 19   CREATININE 1.2   CALCIUM 8.2*   PROT 6.9   ALBUMIN 2.9*   BILITOT 0.4   ALKPHOS 54*   AST 20   ALT 32   ANIONGAP 9   EGFRNONAA >60     Cardiac Markers: No results for input(s): CKMB, MYOGLOBIN, BNP, TROPISTAT in the last 48 hours.  Lactic Acid:   Recent Labs   Lab 04/10/20  0239   LACTATE 1.4     Magnesium:   Recent Labs   Lab 04/10/20  0239   MG 1.9     Troponin:   Recent Labs   Lab 04/10/20  0239   TROPONINI 0.028*     COVID-19: POSITIVE  CRP: 113  PRO-CALCITONIN: 0.2  PHOSPHORUS: 2.8    Significant Imaging:   CXR 4/10  Diffuse increased parenchymal attenuation with patchy ground-glass opacity suggestive of underlying infectious process (such as viral pneumonia) in the appropriate clinical setting with additional differential considerations including pulmonary edema or non infectious inflammatory process.    EKG 4/10:   Initial Reading: No STEMI. Rhythm: Sinus Tachycardia. Heart Rate: 101. Ectopy: No Ectopy. Conduction: Normal. ST Segments: Normal ST Segments. T Waves: Normal. Axis: Normal. Clinical Impression: Sinus Tachycardia     Assessment/Plan:     COVID-19 virus infection  Presented to [facility] ED on 4/10 from [facility] facility with high fever and O2 saturation =88% on RA. Found to be COVID+. Admitted for supportive care.     -airborne and contact isolation  -continuous pulse ox  -Mask on patient at all times, particularly while staff is in the room  -Rocephin and Azithromycin for CAP coverage.   -Supportive oxygen: currently 4L NC  -tylenol and ice packs prn for fever >100.4  ---toradol injection x1 today for uncontrolled fever  -CBC, CMP, CRP, P, Mg every 48 hours  -Mildly elevated troponin on admission with no evident of acute MI on EKG. Trend troponins  -Regular diet  -TEDs, SCDs, Lovenox SQ for DVT prophylaxis        Essential hypertension  -Hypotensive on admission. Improved.   -Resume home meds. Hold for SBP<120  -hydralazine  prn for SBP>160      Hyperlipidemia  Resume home Statin      Chronic hepatitis C  Stable  Referral to hepatology at discharge for evaluation of treatment/curative options      Dementia  Stable, resume home regimen.       Patient's family to be contacted: stable and comfortable on low flow oxygen.     Clementina Simons PA-C  Hospital Medicine [facility]

## 2020-04-10 NOTE — NURSING
Patient in bed. Arousable with voice and alert. Disoriented x 4. Unable to follow simple commands. Oxygen reapplied at 4L via NC and explanation given. Pulse ox probe placed to right index finger. Tele monitor #[id number]. AM medications given along with PRN tylenol as ordered by MD. Patient turned, cleaned, and bed linens changed. Bed placed in low position, side rails up x 3, bed alarm turned on. Will continue to monitor.

## 2020-04-10 NOTE — ASSESSMENT & PLAN NOTE
Presented to [facility] ED on 4/10 from [facility] facility with high fever and O2 saturation =88% on RA. Found to be COVID+. Admitted for supportive care.     -airborne and contact isolation  -continuous pulse ox  -Mask on patient at all times, particularly while staff is in the room  -Rocephin and Azithromycin for CAP coverage.   -Supportive oxygen: currently 4L NC  -tylenol and ice packs prn for fever >100.4  ---toradol injection x1 today for uncontrolled fever  -CBC, CMP, CRP, P, Mg every 48 hours  -Mildly elevated troponin on admission with no evident of acute MI on EKG. Trend troponins  -Regular diet  -TEDs, SCDs, Lovenox SQ for DVT prophylaxis

## 2020-04-10 NOTE — ASSESSMENT & PLAN NOTE
-Hypotensive on admission. Improved.   -Resume home meds. Hold for SBP<120  -hydralazine prn for SBP>160

## 2020-04-10 NOTE — ED PROVIDER NOTES
"Encounter Date: 4/10/2020    SCRIBE #1 NOTE: I, Wendy Wood, am scribing for, and in the presence of,  Jed Rick MD. I have scribed the following portions of the note - Other sections scribed: HPI, ROS, PE.       History     Chief Complaint   Patient presents with    Fever     Pt arrives via EMS from [facility] ([phone]), Pt has fever unrelieved by Tylenol, and 'decreased activity Thursday night" per [facility]     CC: Fever    Patient is a 60 y.o male who presents to the ED for a fever that began PTA. Patient is a resident at [facility]. Per EMS personnel, facility staff reported a fever Tmax of 101.4 F. Fever decreased to 101.1 F with Tylenol 650 mg given at 9 pm last nigtht. Staff also reported patient was feeling bad because he slept in his bed tonight when he typically sleeps in his chair. Patient is unable to provide further history due to Hx of Dementia.     The history is provided by the EMS personnel. History limited by: Dementia.     Review of patient's allergies indicates:  No Known Allergies  Past Medical History:   Diagnosis Date    Dementia     Hepatitis B     Hepatitis C virus infection without hepatic coma     Hypertension     Insomnia     Major neurocognitive disorder     Psychosis     TBI (traumatic brain injury)      History reviewed. No pertinent surgical history.  History reviewed. No pertinent family history.  Social History     Tobacco Use    Smoking status: Unknown If Ever Smoked   Substance Use Topics    Alcohol use: No    Drug use: No     Review of Systems   Unable to perform ROS: Dementia   Constitutional: Positive for fever.       Physical Exam     Initial Vitals [04/10/20 0220]   BP Pulse Resp Temp SpO2   (!) 90/55 104 16 (!) 101.1 °F (38.4 °C) 97 %      MAP       --         Physical Exam    Nursing note and vitals reviewed.  Constitutional: He appears well-developed and well-nourished. He is not diaphoretic. No distress.   HENT:   Head: " Normocephalic and atraumatic.   Right Ear: External ear normal.   Left Ear: External ear normal.   Mouth/Throat: Oropharynx is clear and moist.   Eyes: Conjunctivae and EOM are normal. Pupils are equal, round, and reactive to light. Right eye exhibits no discharge. Left eye exhibits no discharge. No scleral icterus.   Neck: No thyromegaly present. No tracheal deviation present. No JVD present.   Cardiovascular: Normal rate, regular rhythm, normal heart sounds and intact distal pulses. Exam reveals no gallop and no friction rub.    No murmur heard.  Pulmonary/Chest: Breath sounds normal. No stridor. No respiratory distress. He has no wheezes. He has no rhonchi. He has no rales. He exhibits no tenderness.   SpO2 of 88% on room air    Abdominal: Soft. He exhibits no distension. There is no tenderness. There is no rebound and no guarding.   Musculoskeletal: Normal range of motion. He exhibits no edema or tenderness.   Lymphadenopathy:     He has no cervical adenopathy.   Neurological: He is alert. He has normal strength.   LOGAN with NGND's.  Pt resting comfortably with behavior typical of dementia   Skin: Skin is warm and dry. Capillary refill takes less than 2 seconds. No rash noted. No erythema.   Venous stasis dermatitis to lower extremities.   Psychiatric:   Unable to assess due to Hx of Dementia.          ED Course   Procedures  Labs Reviewed   CBC W/ AUTO DIFFERENTIAL - Abnormal; Notable for the following components:       Result Value    RBC 4.04 (*)     Hemoglobin 10.7 (*)     Hematocrit 34.7 (*)     Mean Corpuscular Hemoglobin 26.5 (*)     Mean Corpuscular Hemoglobin Conc 30.8 (*)     RDW 16.4 (*)     Immature Granulocytes 0.7 (*)     Immature Grans (Abs) 0.07 (*)     Mono # 1.1 (*)     Gran% 74.7 (*)     Lymph% 12.3 (*)     All other components within normal limits   COMPREHENSIVE METABOLIC PANEL - Abnormal; Notable for the following components:    Glucose 113 (*)     Calcium 8.2 (*)     Albumin 2.9 (*)      Alkaline Phosphatase 54 (*)     All other components within normal limits   TROPONIN I - Abnormal; Notable for the following components:    Troponin I 0.028 (*)     All other components within normal limits   SARS-COV-2 RNA AMPLIFICATION, QUAL - Abnormal; Notable for the following components:    SARS-CoV-2 RNA, Amplification, Qual Positive (*)     All other components within normal limits    Narrative:     What symptom criteria does the patient meet?->Fever   CULTURE, BLOOD   LACTIC ACID, PLASMA   MAGNESIUM   PHOSPHORUS   PROCALCITONIN   C-REACTIVE PROTEIN   URINALYSIS, REFLEX TO URINE CULTURE     EKG Readings: (Independently Interpreted)   Initial Reading: No STEMI. Rhythm: Sinus Tachycardia. Heart Rate: 101. Ectopy: No Ectopy. Conduction: Normal. ST Segments: Normal ST Segments. T Waves: Normal. Axis: Normal. Clinical Impression: Sinus Tachycardia       Imaging Results          X-Ray Chest AP Portable (Final result)  Result time 04/10/20 03:22:18    Final result by Sherly Uribe MD (04/10/20 03:22:18)                 Impression:      Diffuse increased parenchymal attenuation with patchy ground-glass opacity suggestive of underlying infectious process (such as viral pneumonia) in the appropriate clinical setting with additional differential considerations including pulmonary edema or non infectious inflammatory process.      Electronically signed by: Sherly Uribe MD  Date:    04/10/2020  Time:    03:22             Narrative:    EXAMINATION:  XR CHEST AP PORTABLE    CLINICAL HISTORY:  Sepsis;    TECHNIQUE:  Single frontal view of the chest was performed.    COMPARISON:  12/18/2016    FINDINGS:  Cardiac monitoring leads overlie the chest.  The cardiomediastinal silhouette is prominent in size.  The lungs are symmetrically expanded with diffuse increased parenchymal attenuation and patchy ground-glass opacities.  No significant volume of pleural fluid or pneumothorax.  Visualized osseous structures are intact.                                  Medical Decision Making:   History:   Old Medical Records: I decided to obtain old medical records.  Independently Interpreted Test(s):   I have ordered and independently interpreted EKG Reading(s) - see prior notes  Clinical Tests:   Lab Tests: Ordered and Reviewed  Radiological Study: Ordered and Reviewed  Medical Tests: Ordered and Reviewed          Pt arrived hypoxic with SpO2 of 88% requiring O2 via NC with fever.  Pt's presentation and clinical findings correlated with COVID-19  Pt resting comfortably on O2 via NC.  ABX given for CAP coverage and pt discussed with Dr. Cintron for admission.      Jed Rick MD          Scribe Attestation:   Scribe #1: I performed the above scribed service and the documentation accurately describes the services I performed. I attest to the accuracy of the note.                          Clinical Impression:       ICD-10-CM ICD-9-CM   1. COVID-19 virus infection U07.1    2. Hypotension I95.9 458.9   3. Hypoxia R09.02 799.02             ED Disposition Condition    Admit               I, Jed Rick, personally performed the services described in this documentation. All medical record entries made by the scribe were at my direction and in my presence.  I have reviewed the chart and agree that the record reflects my personal performance and is accurate and complete.  .               Jed Rick MD  04/10/20 [id number]

## 2020-04-10 NOTE — NURSING
Pt arrived on unit, awake alert and comfused. IV site noted. Respirations unlabored. Pt on 4L NC. Skin assessed. Vital assessed. Bed locked and low position for pt safety. Bed alarm set.

## 2020-04-10 NOTE — ED TRIAGE NOTES
"Pt arrives via EMS from [facility] (contact Devika [phone]), pt had 101.4 temp at 9pm Thursday and was given 2 Tylenol, pt fever is now 101.1, per employee at [facility], pt has dementia and usually gets "combative" while being changed, but tonight was not combative at all.  "

## 2020-04-10 NOTE — CARE UPDATE
Went to check on patient at 10:10am. Patient was resting comfortably on 4L with 02 sats 92%. When I woke him up, he was very confused and agitated. I was able to calm him down.  My PA will see momentarily.  From a resp. Standpoint he is very stable.

## 2020-04-11 PROBLEM — J96.01 ACUTE HYPOXEMIC RESPIRATORY FAILURE: Status: ACTIVE | Noted: 2020-01-01

## 2020-04-11 PROBLEM — R45.1 AGITATION: Status: ACTIVE | Noted: 2020-01-01

## 2020-04-11 PROBLEM — D63.8 ANEMIA OF CHRONIC DISEASE: Status: ACTIVE | Noted: 2020-01-01

## 2020-04-11 PROBLEM — R78.81 BACTEREMIA: Status: ACTIVE | Noted: 2020-01-01

## 2020-04-11 NOTE — NURSING
Patient transferred to ICU bed 273. Report given at the bedside. Patient continues to be restless, agitated, increased workload of breathing, audible wheezing, and hypoxic. Refuse to keep non re breather mask in place. Assessed soft restraints. Will notify pt's spouse.

## 2020-04-11 NOTE — SUBJECTIVE & OBJECTIVE
Interval History: Patient agitated with increased work of breathing.      Review of Systems   Unable to perform ROS: Acuity of condition     Objective:     Vital Signs (Most Recent):  Temp: (!) 102.3 °F (39.1 °C) (04/11/20 0915)  Pulse: (!) 111 (04/11/20 0700)  Resp: (!) 21 (04/11/20 0700)  BP: 136/61 (04/11/20 0700)  SpO2: (!) 80 % (04/11/20 0700) Vital Signs (24h Range):  Temp:  [98.3 °F (36.8 °C)-102.9 °F (39.4 °C)] 102.3 °F (39.1 °C)  Pulse:  [] 111  Resp:  [20-30] 21  SpO2:  [80 %-99 %] 80 %  BP: ()/(57-79) 136/61     Weight: 85.4 kg (188 lb 4.4 oz)  Body mass index is 29.49 kg/m².    Intake/Output Summary (Last 24 hours) at 4/11/2020 0921  Last data filed at 4/11/2020 0500  Gross per 24 hour   Intake 360 ml   Output --   Net 360 ml      Physical Exam   Constitutional: He appears well-developed and well-nourished.   HENT:   Head: Normocephalic and atraumatic.   Cardiovascular:   tachy   Pulmonary/Chest: He is in respiratory distress. He has no rales.   Abdominal: Soft. He exhibits no distension.   Neurological: He is alert.   Nursing note and vitals reviewed.      Significant Labs:   BMP:   Recent Labs   Lab 04/10/20  0239   *      K 3.9      CO2 27   BUN 19   CREATININE 1.2   CALCIUM 8.2*   MG 1.9     CBC:   Recent Labs   Lab 04/10/20  0239   WBC 9.38   HGB 10.7*   HCT 34.7*          Significant Imaging:

## 2020-04-11 NOTE — ASSESSMENT & PLAN NOTE
From above.  100% non re-breather and drops sats in mid 80's with increased work of breathing. Will intubate.  May have a component of CHF with BNP > 500.  Will give dose of lasix now.  Cxr now. Echo ordered.

## 2020-04-11 NOTE — NURSING
Patient continues to be restless, moving from side to side in the bed, continues to work o2 off with chin, patient has audible wheezing when agiated, when removing restraints he attempts to hit at staff, pulling at o2 and iv, dr kuhn paged and notified

## 2020-04-11 NOTE — ASSESSMENT & PLAN NOTE
Presented to [facility] ED on 4/10 from [facility] facility with high fever and O2 saturation =88% on RA. Found to be COVID+. Admitted for supportive care.     -airborne and contact isolation  -continuous pulse ox  -Mask on patient at all times, particularly while staff is in the room  -Rocephin and Azithromycin for CAP coverage.   -Supportive oxygen: currently 4L NC  -tylenol and ice packs prn for fever >100.4  ---toradol injection x1 today for uncontrolled fever  -CBC, CMP, CRP, P, Mg every 48 hours  -Mildly elevated troponin on admission with no evident of acute MI on EKG. Trend troponins  -Regular diet  -TEDs, SCDs, Lovenox SQ for DVT prophylaxis    Worsening agitation and hypoxia. Will send to ICU and likely intubate 4/11

## 2020-04-11 NOTE — NURSING
Patient restless, agitated, and hypoxic. Attempting to get out of bed. Unable to secure tele monitor, pulse ox, and venti mask. Sat's on venti mask 50-80%. Place pt on a non re breather sat's decreasing to the 80's and work load of breathing increased. Notified Dr Casanova and pt's temp 102.3.  PRN tylenol administered for temp and ativan for agitation. Maintained fall precautions, bed in lowest position, side rails up x3, bed alarm set, and continues pulse ox in place. Assessed soft restraints. Will continue to monitor.

## 2020-04-11 NOTE — PROGRESS NOTES
[facility]  Progress Note    Patient Name: Bossman Wheeler  MRN: [id number]  Patient Class: IP- Inpatient   Admission Date: 4/10/2020  Length of Stay: 1 days  Attending Physician: Gilson Casanova MD  Primary Care Provider: Primary Doctor No        Subjective:     Principal Problem:COVID-19 virus infection        HPI:  Bossman Wheeler is a 60 y.o. male with pmh of Hep C, Hep B, HTN, Dementia, and TBI who presented to [facility] from [facility] in [place] with cc of fever. Tmax at facility was 101.4 which only decreased to 101.1 with tylenol. He was found with O2 saturation=88% on RA, which stabilized in the ED with O2 via NC. COVID-19 POSITIVE. Normal WBC. Mildly elevated troponin with sinus tach on EKG. Rocephin and Azithromycin started in ED for CAP coverage. Admitted for supportive care.      Overview/Hospital Course:  Bossman Wheeler is a 60 y.o. male with pmh of Hep C, Hep B, HTN, Dementia, and TBI who presented to [facility] from [facility] in [place] with cc of fever. Tmax at facility was 101.4 which only decreased to 101.1 with tylenol. He was found with O2 saturation=88% on RA, which stabilized in the ED with O2 via NC. COVID-19 POSITIVE. Normal WBC. Mildly elevated troponin with sinus tach on EKG. Rocephin and Azithromycin started in ED for CAP coverage. Admitted for supportive care. Now stable at 95% O2 saturation on 4L NC. Oriented to person only. Tmax since gwbkgwbnn=852.7. Home meds reconciled.  The patient became very agitated on the floor requiring restraints, Zyprexa and Ativan. On 4/11, the patient declined from a respiratory standpoint with hypoxia despite being on a 100% mask. The patient was moved to the ICu on 4/11. The patient grew out Strep. Viridans on blood cultures from 4/10. Patient was started on Vancomycin on 4/11 and repeat blood cultures x2 and echo was ordered.      Interval History: Patient agitated  with increased work of breathing.      Review of Systems   Unable to perform ROS: Acuity of condition     Objective:     Vital Signs (Most Recent):  Temp: (!) 102.3 °F (39.1 °C) (04/11/20 0915)  Pulse: (!) 111 (04/11/20 0700)  Resp: (!) 21 (04/11/20 0700)  BP: 136/61 (04/11/20 0700)  SpO2: (!) 80 % (04/11/20 0700) Vital Signs (24h Range):  Temp:  [98.3 °F (36.8 °C)-102.9 °F (39.4 °C)] 102.3 °F (39.1 °C)  Pulse:  [] 111  Resp:  [20-30] 21  SpO2:  [80 %-99 %] 80 %  BP: ()/(57-79) 136/61     Weight: 85.4 kg (188 lb 4.4 oz)  Body mass index is 29.49 kg/m².    Intake/Output Summary (Last 24 hours) at 4/11/2020 0921  Last data filed at 4/11/2020 0500  Gross per 24 hour   Intake 360 ml   Output --   Net 360 ml      Physical Exam   Constitutional: He appears well-developed and well-nourished.   HENT:   Head: Normocephalic and atraumatic.   Cardiovascular:   tachy   Pulmonary/Chest: He is in respiratory distress. He has no rales.   Abdominal: Soft. He exhibits no distension.   Neurological: He is alert.   Nursing note and vitals reviewed.      Significant Labs:   BMP:   Recent Labs   Lab 04/10/20  0239   *      K 3.9      CO2 27   BUN 19   CREATININE 1.2   CALCIUM 8.2*   MG 1.9     CBC:   Recent Labs   Lab 04/10/20  0239   WBC 9.38   HGB 10.7*   HCT 34.7*          Significant Imaging:       Assessment/Plan:      * COVID-19 virus infection  Presented to [facility] ED on 4/10 from [facility] facility with high fever and O2 saturation =88% on RA. Found to be COVID+. Admitted for supportive care.     -airborne and contact isolation  -continuous pulse ox  -Mask on patient at all times, particularly while staff is in the room  -Rocephin and Azithromycin for CAP coverage.   -Supportive oxygen: currently 4L NC  -tylenol and ice packs prn for fever >100.4  ---toradol injection x1 today for uncontrolled fever  -CBC, CMP, CRP, P, Mg every 48 hours  -Mildly elevated troponin on  admission with no evident of acute MI on EKG. Trend troponins  -Regular diet  -TEDs, SCDs, Lovenox SQ for DVT prophylaxis    Worsening agitation and hypoxia. Will send to ICU and likely intubate 4/11         Acute hypoxemic respiratory failure  From above.  100% non re-breather and drops sats in mid 80's with increased work of breathing. Will intubate.  May have a component of CHF with BNP > 500.  Will give dose of lasix now.  Cxr now. Echo ordered.        Bacteremia  Blood cultures from 4/10 Strep Viridans. Will repeat blood cultures x 2. Vanc ordered.  2 decho ordered.       Anemia of chronic disease  Stable. No acute issues.         Agitation  Very difficult to control on the floor- requiring restraints.        Essential hypertension  -Hypotensive on admission. Improved.   -Resume home meds. Hold for SBP<120  -hydralazine prn for SBP>160      Hyperlipidemia  Resume home Statin      Chronic hepatitis C  Stable  Referral to hepatology at discharge for evaluation of treatment/curative options      Dementia  Stable, resume home regimen.         VTE Risk Mitigation (From admission, onward)         Ordered     enoxaparin injection 40 mg  Daily      04/10/20 1329     IP VTE LOW RISK PATIENT  Once      04/10/20 0610     Place PRAVEENA hose  Until discontinued      04/10/20 0610     Place sequential compression device  Until discontinued      04/10/20 0610              Will move to ICU. Lasix, Vancomycin, repeat blood cultures, pulmonary consult, CXR, echo of heart. Will likely need to be intubated.      Called patient's wife at 9:15am- no answer. Will attempt to call again shortly to update.             Gilson Hardwick MD  Department of Hospital Medicine   [facility]

## 2020-04-11 NOTE — PROCEDURES
"Bossman Wheeler is a 60 y.o. male patient.    Temp: (!) 102.8 °F (39.3 °C) (04/11/20 1015)  Pulse: (!) 59 (04/11/20 1230)  Resp: (!) 23 (04/11/20 1230)  BP: 95/64 (04/11/20 1230)  SpO2: 100 % (04/11/20 1230)  Weight: 85.4 kg (188 lb 4.4 oz) (04/10/20 1645)  Height: 5' 10" (177.8 cm) (04/11/20 1241)    PICC  Date/Time: 4/11/2020 12:45 PM  Performed by: Chito Shah RN  Consent Done: Yes  Time out: Immediately prior to procedure a time out was called to verify the correct patient, procedure, equipment, support staff and site/side marked as required  Indications: med administration  Anesthesia: general anesthesia  Local anesthetic: lidocaine 1% without epinephrine  Anesthetic Total (mL): 1  Preparation: skin prepped with ChloraPrep  Skin prep agent dried: skin prep agent completely dried prior to procedure  Sterile barriers: all five maximum sterile barriers used - cap, mask, sterile gown, sterile gloves, and large sterile sheet  Hand hygiene: hand hygiene performed prior to central venous catheter insertion  Location details: right basilic  Catheter type: triple lumen  Catheter size: 5 Fr  Catheter Length: 38cm    Ultrasound guidance: yes  Vessel Caliber: medium and large and patent, compressibility normal  Vascular Doppler: not done  Needle advanced into vessel with real time Ultrasound guidance.  Guidewire confirmed in vessel.  Sterile sheath used.  Number of attempts: 1  Post-procedure: blood return through all ports, chlorhexidine patch and sterile dressing applied  Estimated blood loss (mL): 0            Chito Shah  4/11/2020  "

## 2020-04-11 NOTE — ASSESSMENT & PLAN NOTE
Blood cultures from 4/10 Strep Viridans. Will repeat blood cultures x 2. Vanc ordered.  2 decho ordered.

## 2020-04-11 NOTE — PROGRESS NOTES
Pharmacokinetic Initial Assessment: IV Vancomycin    Assessment/Plan:    Initiate intravenous vancomycin with loading dose of 1250  mg once followed by a maintenance dose of vancomycin 1750 mg IV every 24 hours  Desired empiric serum trough concentration is 10 to 20 mcg/mL  Draw vancomycin trough level 30 min prior to third dose on 4/13/20 at approximately 11:30   Pharmacy will continue to follow and monitor vancomycin.      Please contact pharmacy at extension [phone] with any questions regarding this assessment.     Thank you for the consult,   Ban Agustin       Patient brief summary:  Bossman Wheeler is a 60 y.o. male initiated on antimicrobial therapy with IV Vancomycin for treatment of suspected bacteremia    Drug Allergies:   Review of patient's allergies indicates:  No Known Allergies    Actual Body Weight:   85.4 kg    Renal Function:   Estimated Creatinine Clearance: 60.8 mL/min (based on SCr of 1.3 mg/dL).,     Dialysis Method (if applicable):  N/A    CBC (last 72 hours):  Recent Labs   Lab Result Units 04/10/20  0239 04/11/20  1128   WBC K/uL 9.38 8.72   Hemoglobin g/dL 10.7* 9.3*   Hematocrit % 34.7* 29.7*   Platelets K/uL 165 155   Gran% % 74.7* 81.9*   Lymph% % 12.3* 6.3*   Mono% % 12.0 10.4   Eosinophil% % 0.2 0.0   Basophil% % 0.1 0.1   Differential Method  Automated Automated       Metabolic Panel (last 72 hours):  Recent Labs   Lab Result Units 04/10/20  0239 04/11/20  1128 04/11/20  1200   Sodium mmol/L 142 144  --    Potassium mmol/L 3.9 3.9  --    Chloride mmol/L 106 110  --    CO2 mmol/L 27 23  --    Glucose mg/dL 113* 131*  --    Glucose, UA   --   --  Negative   BUN, Bld mg/dL 19 25*  --    Creatinine mg/dL 1.2 1.3  --    Albumin g/dL 2.9* 2.5*  --    Total Bilirubin mg/dL 0.4 0.2  --    Alkaline Phosphatase U/L 54* 44*  --    AST U/L 20 38  --    ALT U/L 32 30  --    Magnesium mg/dL 1.9  --   --    Phosphorus mg/dL 2.8  --   --        Drug levels (last 3 results):  No results for input(s):  VANCOMYCINRA, VANCOMYCINPE, VANCOMYCINTR in the last 72 hours.    Microbiologic Results:  Microbiology Results (last 7 days)       Procedure Component Value Units Date/Time    Blood culture [[id number]] Collected:  04/11/20 1128    Order Status:  Sent Specimen:  Blood Updated:  04/11/20 1135    Blood culture [[id number]] Collected:  04/11/20 1128    Order Status:  Sent Specimen:  Blood Updated:  04/11/20 1135    Blood culture x two cultures. Draw prior to antibiotics. [[id number]]  (Abnormal) Collected:  04/10/20 0237    Order Status:  Completed Specimen:  Blood from Peripheral, Lower Arm, Right Updated:  04/11/20 0757     Blood Culture, Routine Gram stain aer bottle: Gram positive cocci in chains resembling Strep      Results called to and read back by: Xiomara Caruso RN.      Gram stain paula bottle: Gram positive cocci in chains resembling Strep       Positive results previously called      VIRIDANS STREPTOCOCCUS GROUP  Identification and susceptibility pending      Narrative:       Aerobic and anaerobic    Blood culture x two cultures. Draw prior to antibiotics. [[id number]]     Order Status:  Canceled Specimen:  Blood

## 2020-04-11 NOTE — PLAN OF CARE
Problem: Fall Injury Risk  Goal: Absence of Fall and Fall-Related Injury  Outcome: Ongoing, Progressing     Problem: Skin Injury Risk Increased  Goal: Skin Health and Integrity  Outcome: Ongoing, Progressing     Problem: Restraint, Nonbehavioral (Nonviolent)  Goal: Discontinuation Criteria Achieved  Outcome: Ongoing, Progressing   Patient continues to require rerestraints for safety

## 2020-04-11 NOTE — NURSING
Called patient's spouse twice at [phone] to notify family pt has been transferred to ICU bed 273. No answer.

## 2020-04-12 PROBLEM — A41.9 SEPTIC SHOCK: Status: ACTIVE | Noted: 2020-01-01

## 2020-04-12 PROBLEM — R65.21 SEPTIC SHOCK: Status: ACTIVE | Noted: 2020-01-01

## 2020-04-12 NOTE — SUBJECTIVE & OBJECTIVE
Past Medical History:   Diagnosis Date    Dementia     Hepatitis B     Hepatitis C virus infection without hepatic coma     Hypertension     Insomnia     Major neurocognitive disorder     Psychosis     TBI (traumatic brain injury)        History reviewed. No pertinent surgical history.    Review of patient's allergies indicates:  No Known Allergies    Family History     None        Tobacco Use    Smoking status: Unknown If Ever Smoked   Substance and Sexual Activity    Alcohol use: No    Drug use: No    Sexual activity: Not on file         Review of Systems   Unable to perform ROS: Dementia     Objective:     Vital Signs (Most Recent):  Temp: (!) 94.9 °F (34.9 °C) (04/12/20 0445)  Pulse: 62 (04/12/20 0445)  Resp: (!) 32 (04/12/20 0445)  BP: (!) 89/60 (04/12/20 0445)  SpO2: (!) 91 % (04/12/20 0445) Vital Signs (24h Range):  Temp:  [94 °F (34.4 °C)-102.8 °F (39.3 °C)] 94.9 °F (34.9 °C)  Pulse:  [] 62  Resp:  [18-41] 32  SpO2:  [84 %-100 %] 91 %  BP: ()/(54-80) 89/60     Weight: 85.4 kg (188 lb 4.4 oz)  Body mass index is 27.01 kg/m².      Intake/Output Summary (Last 24 hours) at 4/12/2020 0708  Last data filed at 4/12/2020 0400  Gross per 24 hour   Intake 613.72 ml   Output 2325 ml   Net -1711.28 ml       Physical Exam   Constitutional: He appears well-developed. He is uncooperative. He has a sickly appearance. No distress. He is restrained. Nasal cannula in place.   HENT:   Head: Normocephalic and atraumatic.   Eyes: Pupils are equal, round, and reactive to light. Conjunctivae are normal. Right eye exhibits no exudate. Left eye exhibits no exudate. Right conjunctiva has no hemorrhage. Left conjunctiva has no hemorrhage. No scleral icterus. Right eye exhibits no nystagmus. Left eye exhibits no nystagmus.   Neck: Trachea normal. No neck rigidity. No tracheal deviation present.   Cardiovascular: Normal rate, regular rhythm and normal heart sounds. PMI is not displaced. Exam reveals no gallop and  no friction rub.   No murmur heard.  Pulses:       Radial pulses are 2+ on the right side, and 2+ on the left side.        Dorsalis pedis pulses are 1+ on the right side, and 1+ on the left side.   Warm extremities, no edema noted   Pulmonary/Chest: Breath sounds normal. Accessory muscle usage present. Tachypnea noted. No respiratory distress. He has no wheezes. He has no rhonchi. He has no rales.   Abdominal: Soft. Normal appearance and bowel sounds are normal. There is no tenderness.   Musculoskeletal: Normal range of motion.   Neurological: He is alert.   Alert and agitated, does not follow commands, spontaneously moves BUE 4/5, BLE 4/5, no focal deficits to note.      Skin: Skin is warm and dry. Capillary refill takes 2 to 3 seconds. He is not diaphoretic. No cyanosis. Nails show no clubbing.   Nursing note and vitals reviewed.      Vents:  Oxygen Concentration (%): 70 (04/12/20 0433)    Lines/Drains/Airways     Peripherally Inserted Central Catheter Line            PICC Triple Lumen 04/11/20 1245 right basilic less than 1 day          Drain                 NG/OG Tube 04/11/20 1232 16 Fr. Right nostril less than 1 day         Urethral Catheter 04/11/20 1000 less than 1 day          Peripheral Intravenous Line                 Peripheral IV - Single Lumen 04/10/20 0240 20 G Right;Distal Forearm 2 days         Peripheral IV - Single Lumen 04/11/20 1102 18 G Left;Posterior Hand less than 1 day                Significant Labs:    CBC/Anemia Profile:  Recent Labs   Lab 04/11/20  1128 04/12/20  0244   WBC 8.72 10.45   HGB 9.3* 11.3*   HCT 29.7* 36.6*    175   MCV 84 85   RDW 16.5* 16.3*        Chemistries:  Recent Labs   Lab 04/11/20  1128 04/12/20  0244    141   K 3.9 3.9    105   CO2 23 27   BUN 25* 28*   CREATININE 1.3 0.9   CALCIUM 7.5* 7.5*   ALBUMIN 2.5* 2.2*   PROT 6.5 6.9   BILITOT 0.2 0.2   ALKPHOS 44* 47*   ALT 30 34   AST 38 48*   MG  --  2.1   PHOS  --  3.3       All pertinent labs within  the past 24 hours have been reviewed.    Significant Imaging:   I have reviewed all pertinent imaging results/findings within the past 24 hours.

## 2020-04-12 NOTE — ASSESSMENT & PLAN NOTE
- Continue conservative management, recommend redirection  - Okay with low dose anti-psychotics, would avoid benzos   - soft restraints as needed  - Caution with ICU delirium

## 2020-04-12 NOTE — ASSESSMENT & PLAN NOTE
59 y/o male admitted with COVID-19.   Blood cultures were 2/2 positive for viridans strep.  Source unclear.  This pathogen is often seen in the mouth and in the GI tract.  Also a very small chance that blood sample was contaminated but given his critical illness we need to treat this.  Recommend treatment with ceftriaxone 2 grams IV q 24 hours.  Follow up repeat cultures.

## 2020-04-12 NOTE — HPI
Bossman Wheeler is a 60 y.o. male with pmh of Hep C, Hep B, HTN, Dementia, and TBI who presented to [facility] from [facility] in [place] with cc of fever. Tmax at facility was 101.4 which only decreased to 101.1 with tylenol. He was found with O2 saturation=88% on RA, which stabilized in the ED with O2 via NC. COVID-19 POSITIVE. Normal WBC. Mildly elevated troponin with sinus tach on EKG. Rocephin and Azithromycin started in ED for CAP coverage. Admitted for supportive care. Now stable at 95% O2 saturation on 4L NC. Oriented to person only. Tmax since nsflrqdea=205.7. Home meds reconciled.  The patient became very agitated on the floor requiring restraints, Zyprexa and Ativan. On 4/11, the patient declined from a respiratory standpoint with hypoxia despite being on a 100% mask. The patient was moved to the ICu on 4/11. The patient grew out Strep. Viridans on blood cultures from 4/10. Patient was started on Vancomycin on 4/11 and repeat blood cultures x2 and echo was ordered.

## 2020-04-12 NOTE — CARE UPDATE
Artificial Intelligence Notification      Admit Date: 4/10/2020  LOS: 1  Code Status: Full Code   Date of Consult: 2020  : 1960  Age: 60 y.o.  Weight:   Wt Readings from Last 1 Encounters:   04/10/20 85.4 kg (188 lb 4.4 oz)     Sex: male  Bed: W273/W273 A:   MRN: [id number]  Attending Physician: Gilson Casanova MD  Primary Service: Networked reference to record PCT                    Vital Signs (Most Recent):  Temp: (!) 94.5 °F (34.7 °C) (20 1600)  Pulse: 69 (20)  Resp: (!) 28 (20)  BP: 103/71 (20)  SpO2: 99 % (20) Vital Signs (24h Range):  Temp:  [94.5 °F (34.7 °C)-102.8 °F (39.3 °C)] 94.5 °F (34.7 °C)  Pulse:  [] 69  Resp:  [18-41] 28  SpO2:  [80 %-100 %] 99 %  BP: ()/(54-80) 103/71         This encounter was triggered by an Artificial Intelligence Notification.     COVID patient in respiratory distress.  Noted that primary attending rounded on patients just now and is transferring patient to ICU due to respiratory distress.

## 2020-04-12 NOTE — CONSULTS
[facility]  Infectious Disease  Consult Note    Patient Name: Bossman Wheeler  MRN: [id number]  Admission Date: 4/10/2020  Hospital Length of Stay: 2 days  Attending Physician: Conner Kemp*  Primary Care Provider: Primary Doctor No     Isolation Status: Airborne and Contact and Droplet    Patient information was obtained from past medical records and ER records.      Inpatient consult to Infectious Diseases  Consult performed by: Rahat Lomas MD  Consult ordered by: Yanci Johnson NP        Assessment/Plan:     * COVID-19 virus infection  61 y/o male with a past medical history significant for alzheimer's dementia, traumatic brain injury, hepatitis C.  He was transferred from [facility] in [place] with complaints of fever.  Oxygen saturation was 88% on room air.  COVID-19 test was positive.  He has been slowly requiring more oxygen.  I am consulted because blood cultures obtained as part of his work up returned positive for viridans strep.     Patient is DNR/DNI.  Continue with supportive care.  Continue hydroxychloroquine.    Bacteremia  61 y/o male admitted with COVID-19.   Blood cultures were 2/2 positive for viridans strep.  Source unclear.  This pathogen is often seen in the mouth and in the GI tract.  Also a very small chance that blood sample was contaminated but given his critical illness we need to treat this.  Recommend treatment with ceftriaxone 2 grams IV q 24 hours.  Follow up repeat cultures.          Thank you for your consult. I will follow-up with patient. Please contact us if you have any additional questions.    Rahat Lomas MD  Infectious Disease  [facility]    Subjective:     Principal Problem: COVID-19 virus infection    HPI: 61 y/o male with a past medical history significant for alzheimer's dementia, traumatic brain injury, hepatitis C.  He was transferred from [facility] in [place] with complaints of  fever.  Oxygen saturation was 88% on room air.  COVID-19 test was positive.  He has been slowly requiring more oxygen.  I am consulted because blood cultures obtained as part of his work up returned positive for viridans strep.      Past Medical History:   Diagnosis Date    Dementia     Hepatitis B     Hepatitis C virus infection without hepatic coma     Hypertension     Insomnia     Major neurocognitive disorder     Psychosis     TBI (traumatic brain injury)        History reviewed. No pertinent surgical history.    Review of patient's allergies indicates:  No Known Allergies    Medications:  Medications Prior to Admission   Medication Sig    amLODIPine (NORVASC) 5 MG tablet Take 5 mg by mouth 2 (two) times daily.     aspirin (ECOTRIN) 81 MG EC tablet Take 81 mg by mouth once daily.    atorvastatin (LIPITOR) 40 MG tablet Take 40 mg by mouth once daily.    divalproex (DEPAKOTE) 500 MG TbEC Take 500 mg by mouth 2 (two) times daily.    donepezil (ARICEPT) 10 MG tablet Take 10 mg by mouth every evening.    famotidine (PEPCID) 20 MG tablet Take 20 mg by mouth once daily.    lisinopriL (PRINIVIL,ZESTRIL) 5 MG tablet Take 5 mg by mouth once daily.    mirtazapine (REMERON) 15 MG tablet Take 15 mg by mouth every evening.    LORazepam (ATIVAN) 0.5 MG tablet Take 1 mg by mouth every 6 (six) hours as needed for Agitation.    memantine (NAMENDA) 10 MG Tab Take 5 mg by mouth once daily.    QUEtiapine (SEROQUEL) 50 MG tablet Take 50 mg by mouth every evening.    traZODone (DESYREL) 100 MG tablet Take 100 mg by mouth once daily.    [DISCONTINUED] amLODIPine (NORVASC) 10 MG tablet Take 10 mg by mouth.     Antibiotics (From admission, onward)    Start     Stop Route Frequency Ordered    04/12/20 1200  vancomycin (VANCOCIN) 1,750 mg in dextrose 5 % 500 mL IVPB      -- IV Every 24 hours (non-standard times) 04/11/20 1253    04/12/20 0500  cefTRIAXone (ROCEPHIN) 2 g in dextrose 5 % 50 mL IVPB      -- IV Every 24  hours (non-standard times) 04/11/20 0804    04/11/20 1015  azithromycin tablet 250 mg      04/16 0859 Oral Daily 04/11/20 0912    04/11/20 1012  vancomycin - pharmacy to dose  (vancomycin IVPB)      -- IV pharmacy to manage frequency 04/11/20 0912        Antifungals (From admission, onward)    None        Antivirals (From admission, onward)    None           Immunization History   Administered Date(s) Administered    Influenza - Quadrivalent - PF (6 months and older) 10/21/2016    PPD Test 03/08/2018       Family History     None        Social History     Socioeconomic History    Marital status:      Spouse name: Not on file    Number of children: Not on file    Years of education: Not on file    Highest education level: Not on file   Occupational History    Not on file   Social Needs    Financial resource strain: Not on file    Food insecurity:     Worry: Not on file     Inability: Not on file    Transportation needs:     Medical: Not on file     Non-medical: Not on file   Tobacco Use    Smoking status: Unknown If Ever Smoked   Substance and Sexual Activity    Alcohol use: No    Drug use: No    Sexual activity: Not on file   Lifestyle    Physical activity:     Days per week: Not on file     Minutes per session: Not on file    Stress: Not on file   Relationships    Social connections:     Talks on phone: Not on file     Gets together: Not on file     Attends Yazdanism service: Not on file     Active member of club or organization: Not on file     Attends meetings of clubs or organizations: Not on file     Relationship status: Not on file   Other Topics Concern    Not on file   Social History Narrative    ** Merged History Encounter **          Review of Systems   Unable to perform ROS: Dementia     Objective:     Vital Signs (Most Recent):  Temp: 97.6 °F (36.4 °C) (04/12/20 0700)  Pulse: 89 (04/12/20 1200)  Resp: (!) 39 (04/12/20 1200)  BP: (!) 85/55 (04/12/20 1200)  SpO2: (!) 91 % (04/12/20  1200) Vital Signs (24h Range):  Temp:  [94 °F (34.4 °C)-98.7 °F (37.1 °C)] 97.6 °F (36.4 °C)  Pulse:  [51-89] 89  Resp:  [23-44] 39  SpO2:  [84 %-100 %] 91 %  BP: ()/(55-77) 85/55     Weight: 85.4 kg (188 lb 4.4 oz)  Body mass index is 27.01 kg/m².    Estimated Creatinine Clearance: 87.9 mL/min (based on SCr of 0.9 mg/dL).    Physical Exam   HENT:   Supplemental oxygen via BiPAP   Eyes: Right eye exhibits no discharge. Left eye exhibits no discharge.   Cardiovascular: Normal rate, regular rhythm and normal heart sounds.   Pulmonary/Chest:   Supplemental oxygen via BiPAP   Abdominal: Soft. Bowel sounds are normal. He exhibits no distension.   Musculoskeletal: He exhibits no edema.   Neurological:   Not responding to questions or following commands   Skin: Skin is dry.   Nursing note and vitals reviewed.      Significant Labs:   Microbiology Results (last 7 days)     Procedure Component Value Units Date/Time    Blood culture [[id number]] Collected:  04/11/20 1128    Order Status:  Completed Specimen:  Blood Updated:  04/12/20 1303     Blood Culture, Routine No Growth to date      No Growth to date    Blood culture [[id number]] Collected:  04/11/20 1128    Order Status:  Completed Specimen:  Blood Updated:  04/12/20 1303     Blood Culture, Routine No Growth to date      No Growth to date    Blood culture [[id number]]     Order Status:  Canceled Specimen:  Blood     Blood culture [[id number]]     Order Status:  Canceled Specimen:  Blood     Blood culture x two cultures. Draw prior to antibiotics. [[id number]]  (Abnormal) Collected:  04/10/20 0237    Order Status:  Completed Specimen:  Blood from Peripheral, Lower Arm, Right Updated:  04/11/20 0757     Blood Culture, Routine Gram stain aer bottle: Gram positive cocci in chains resembling Strep      Results called to and read back by: Xiomara Caruso RN.      Gram stain paula bottle: Gram positive cocci in chains resembling Strep       Positive results previously called       VIRIDANS STREPTOCOCCUS GROUP  Identification and susceptibility pending      Narrative:       Aerobic and anaerobic    Blood culture x two cultures. Draw prior to antibiotics. [[id number]]     Order Status:  Canceled Specimen:  Blood           Significant Imaging: I have reviewed all pertinent imaging results/findings within the past 24 hours.

## 2020-04-12 NOTE — CONSULTS
[facility]  Pulmonology  Consult Note    Patient Name: Bossman Wheeler  MRN: [id number]  Admission Date: 4/10/2020  Hospital Length of Stay: 2 days  Code Status: Full Code  Attending Physician: No att. providers found  Primary Care Provider: Primary Doctor No   Principal Problem: COVID-19 virus infection    Inpatient consult to Pulmonology  Consult performed by: Pj Thomas NP  Consult ordered by: Gilson Casanova MD        Subjective:     HPI:  Bossman Wheeler is a 60 y.o. male with pmh of Hep C, Hep B, HTN, Dementia, and TBI who presented to [facility] from [facility] in [place] with cc of fever. Tmax at facility was 101.4 which only decreased to 101.1 with tylenol. He was found with O2 saturation=88% on RA, which stabilized in the ED with O2 via NC. COVID-19 POSITIVE. Normal WBC. Mildly elevated troponin with sinus tach on EKG. Rocephin and Azithromycin started in ED for CAP coverage. Admitted for supportive care. Now stable at 95% O2 saturation on 4L NC. Oriented to person only. Tmax since jwgqjyylr=049.7. Home meds reconciled.  The patient became very agitated on the floor requiring restraints, Zyprexa and Ativan. On 4/11, the patient declined from a respiratory standpoint with hypoxia despite being on a 100% mask. The patient was moved to the ICu on 4/11. The patient grew out Strep. Viridans on blood cultures from 4/10. Patient was started on Vancomycin on 4/11 and repeat blood cultures x2 and echo was ordered.       Past Medical History:   Diagnosis Date    Dementia     Hepatitis B     Hepatitis C virus infection without hepatic coma     Hypertension     Insomnia     Major neurocognitive disorder     Psychosis     TBI (traumatic brain injury)        History reviewed. No pertinent surgical history.    Review of patient's allergies indicates:  No Known Allergies    Family History     None        Tobacco Use    Smoking status: Unknown If Ever Smoked    Substance and Sexual Activity    Alcohol use: No    Drug use: No    Sexual activity: Not on file         Review of Systems   Unable to perform ROS: Dementia     Objective:     Vital Signs (Most Recent):  Temp: (!) 94.9 °F (34.9 °C) (04/12/20 0445)  Pulse: 62 (04/12/20 0445)  Resp: (!) 32 (04/12/20 0445)  BP: (!) 89/60 (04/12/20 0445)  SpO2: (!) 91 % (04/12/20 0445) Vital Signs (24h Range):  Temp:  [94 °F (34.4 °C)-102.8 °F (39.3 °C)] 94.9 °F (34.9 °C)  Pulse:  [] 62  Resp:  [18-41] 32  SpO2:  [84 %-100 %] 91 %  BP: ()/(54-80) 89/60     Weight: 85.4 kg (188 lb 4.4 oz)  Body mass index is 27.01 kg/m².      Intake/Output Summary (Last 24 hours) at 4/12/2020 0708  Last data filed at 4/12/2020 0400  Gross per 24 hour   Intake 613.72 ml   Output 2325 ml   Net -1711.28 ml       Physical Exam   Constitutional: He appears well-developed. He is uncooperative. He has a sickly appearance. No distress. He is restrained. Nasal cannula in place.   HENT:   Head: Normocephalic and atraumatic.   Eyes: Pupils are equal, round, and reactive to light. Conjunctivae are normal. Right eye exhibits no exudate. Left eye exhibits no exudate. Right conjunctiva has no hemorrhage. Left conjunctiva has no hemorrhage. No scleral icterus. Right eye exhibits no nystagmus. Left eye exhibits no nystagmus.   Neck: Trachea normal. No neck rigidity. No tracheal deviation present.   Cardiovascular: Normal rate, regular rhythm and normal heart sounds. PMI is not displaced. Exam reveals no gallop and no friction rub.   No murmur heard.  Pulses:       Radial pulses are 2+ on the right side, and 2+ on the left side.        Dorsalis pedis pulses are 1+ on the right side, and 1+ on the left side.   Warm extremities, no edema noted   Pulmonary/Chest: Breath sounds normal. Accessory muscle usage present. Tachypnea noted. No respiratory distress. He has no wheezes. He has no rhonchi. He has no rales.   Abdominal: Soft. Normal appearance and bowel  sounds are normal. There is no tenderness.   Musculoskeletal: Normal range of motion.   Neurological: He is alert.   Alert and agitated, does not follow commands, spontaneously moves BUE 4/5, BLE 4/5, no focal deficits to note.      Skin: Skin is warm and dry. Capillary refill takes 2 to 3 seconds. He is not diaphoretic. No cyanosis. Nails show no clubbing.   Nursing note and vitals reviewed.      Vents:  Oxygen Concentration (%): 70 (04/12/20 0433)    Lines/Drains/Airways     Peripherally Inserted Central Catheter Line            PICC Triple Lumen 04/11/20 1245 right basilic less than 1 day          Drain                 NG/OG Tube 04/11/20 1232 16 Fr. Right nostril less than 1 day         Urethral Catheter 04/11/20 1000 less than 1 day          Peripheral Intravenous Line                 Peripheral IV - Single Lumen 04/10/20 0240 20 G Right;Distal Forearm 2 days         Peripheral IV - Single Lumen 04/11/20 1102 18 G Left;Posterior Hand less than 1 day                Significant Labs:    CBC/Anemia Profile:  Recent Labs   Lab 04/11/20  1128 04/12/20  0244   WBC 8.72 10.45   HGB 9.3* 11.3*   HCT 29.7* 36.6*    175   MCV 84 85   RDW 16.5* 16.3*        Chemistries:  Recent Labs   Lab 04/11/20  1128 04/12/20  0244    141   K 3.9 3.9    105   CO2 23 27   BUN 25* 28*   CREATININE 1.3 0.9   CALCIUM 7.5* 7.5*   ALBUMIN 2.5* 2.2*   PROT 6.5 6.9   BILITOT 0.2 0.2   ALKPHOS 44* 47*   ALT 30 34   AST 38 48*   MG  --  2.1   PHOS  --  3.3       All pertinent labs within the past 24 hours have been reviewed.    Significant Imaging:   I have reviewed all pertinent imaging results/findings within the past 24 hours.    Assessment/Plan:     Acute hypoxemic respiratory failure  Patient presented with complaints of SOB and weakness. CXR with bilateral opacities. Flu negative. Procal .20, , lactate 1.4    Currently tolerating Venti Mask    COVID positive  --CRP elevated trend q48  -- check D-dimer if develops  TAYA  --placed on hydroxychloroquine (x5 days) and azithromycin (z-magdalene x5 days) (Qtc  <400) end date 04/15  --sputum culture deferred  --lasix for diuresis elevated BNP  --protocol weaning for paO2 55-80 and O2 sats 88-95%  --Aspiration precautions in place.   --Continue special isolation precautions.              Above plan discussed with Dr. Shook    Critical Care time 85 minutes    Critical care was time spent personally by me on the following activities: evaluating this patient's organ dysfunction, development of treatment plan, discussing treatment plan with patient or surrogate and bedside caregivers, discussions with consultants, evaluation of patient's response to treatment, examination of patient, ordering and performing treatments and interventions, ordering and review of laboratory studies, ordering and review of radiographic studies, re-evaluation of patient's condition. This critical care time did not overlap with that of any other provider or involve time for any procedures.        Thank you for your consult. I will follow-up with patient. Please contact us if you have any additional questions.     Pj Thomas NP  Pulmonology  [facility]

## 2020-04-12 NOTE — ASSESSMENT & PLAN NOTE
Patient presented with complaints of SOB and weakness. CXR with bilateral opacities. Flu negative. Procal .20, , lactate 1.4    Currently tolerating Venti Mask    COVID positive  --CRP elevated trend q48  -- check D-dimer if develops TAYA  --placed on hydroxychloroquine (x5 days) and azithromycin (z-magdalene x5 days) (Qtc <400) end date 04/15  --sputum culture deferred  --lasix for diuresis elevated BNP  --protocol weaning for paO2 55-80 and O2 sats 88-95%  --Aspiration precautions in place.   --Continue special isolation precautions.

## 2020-04-12 NOTE — ASSESSMENT & PLAN NOTE
61 y/o male with a past medical history significant for alzheimer's dementia, traumatic brain injury, hepatitis C.  He was transferred from [facility] in [place] with complaints of fever.  Oxygen saturation was 88% on room air.  COVID-19 test was positive.  He has been slowly requiring more oxygen.  I am consulted because blood cultures obtained as part of his work up returned positive for viridans strep.     Patient is DNR/DNI.  Continue with supportive care.  Continue hydroxychloroquine.

## 2020-04-12 NOTE — SUBJECTIVE & OBJECTIVE
Past Medical History:   Diagnosis Date    Dementia     Hepatitis B     Hepatitis C virus infection without hepatic coma     Hypertension     Insomnia     Major neurocognitive disorder     Psychosis     TBI (traumatic brain injury)        History reviewed. No pertinent surgical history.    Review of patient's allergies indicates:  No Known Allergies    Medications:  Medications Prior to Admission   Medication Sig    amLODIPine (NORVASC) 5 MG tablet Take 5 mg by mouth 2 (two) times daily.     aspirin (ECOTRIN) 81 MG EC tablet Take 81 mg by mouth once daily.    atorvastatin (LIPITOR) 40 MG tablet Take 40 mg by mouth once daily.    divalproex (DEPAKOTE) 500 MG TbEC Take 500 mg by mouth 2 (two) times daily.    donepezil (ARICEPT) 10 MG tablet Take 10 mg by mouth every evening.    famotidine (PEPCID) 20 MG tablet Take 20 mg by mouth once daily.    lisinopriL (PRINIVIL,ZESTRIL) 5 MG tablet Take 5 mg by mouth once daily.    mirtazapine (REMERON) 15 MG tablet Take 15 mg by mouth every evening.    LORazepam (ATIVAN) 0.5 MG tablet Take 1 mg by mouth every 6 (six) hours as needed for Agitation.    memantine (NAMENDA) 10 MG Tab Take 5 mg by mouth once daily.    QUEtiapine (SEROQUEL) 50 MG tablet Take 50 mg by mouth every evening.    traZODone (DESYREL) 100 MG tablet Take 100 mg by mouth once daily.    [DISCONTINUED] amLODIPine (NORVASC) 10 MG tablet Take 10 mg by mouth.     Antibiotics (From admission, onward)    Start     Stop Route Frequency Ordered    04/12/20 1200  vancomycin (VANCOCIN) 1,750 mg in dextrose 5 % 500 mL IVPB      -- IV Every 24 hours (non-standard times) 04/11/20 1253    04/12/20 0500  cefTRIAXone (ROCEPHIN) 2 g in dextrose 5 % 50 mL IVPB      -- IV Every 24 hours (non-standard times) 04/11/20 0804    04/11/20 1015  azithromycin tablet 250 mg      04/16 0859 Oral Daily 04/11/20 0912    04/11/20 1012  vancomycin - pharmacy to dose  (vancomycin IVPB)      -- IV pharmacy to manage frequency  04/11/20 0912        Antifungals (From admission, onward)    None        Antivirals (From admission, onward)    None           Immunization History   Administered Date(s) Administered    Influenza - Quadrivalent - PF (6 months and older) 10/21/2016    PPD Test 03/08/2018       Family History     None        Social History     Socioeconomic History    Marital status:      Spouse name: Not on file    Number of children: Not on file    Years of education: Not on file    Highest education level: Not on file   Occupational History    Not on file   Social Needs    Financial resource strain: Not on file    Food insecurity:     Worry: Not on file     Inability: Not on file    Transportation needs:     Medical: Not on file     Non-medical: Not on file   Tobacco Use    Smoking status: Unknown If Ever Smoked   Substance and Sexual Activity    Alcohol use: No    Drug use: No    Sexual activity: Not on file   Lifestyle    Physical activity:     Days per week: Not on file     Minutes per session: Not on file    Stress: Not on file   Relationships    Social connections:     Talks on phone: Not on file     Gets together: Not on file     Attends Cheondoism service: Not on file     Active member of club or organization: Not on file     Attends meetings of clubs or organizations: Not on file     Relationship status: Not on file   Other Topics Concern    Not on file   Social History Narrative    ** Merged History Encounter **          Review of Systems   Unable to perform ROS: Dementia     Objective:     Vital Signs (Most Recent):  Temp: 97.6 °F (36.4 °C) (04/12/20 0700)  Pulse: 89 (04/12/20 1200)  Resp: (!) 39 (04/12/20 1200)  BP: (!) 85/55 (04/12/20 1200)  SpO2: (!) 91 % (04/12/20 1200) Vital Signs (24h Range):  Temp:  [94 °F (34.4 °C)-98.7 °F (37.1 °C)] 97.6 °F (36.4 °C)  Pulse:  [51-89] 89  Resp:  [23-44] 39  SpO2:  [84 %-100 %] 91 %  BP: ()/(55-77) 85/55     Weight: 85.4 kg (188 lb 4.4 oz)  Body mass  index is 27.01 kg/m².    Estimated Creatinine Clearance: 87.9 mL/min (based on SCr of 0.9 mg/dL).    Physical Exam   HENT:   Supplemental oxygen via BiPAP   Eyes: Right eye exhibits no discharge. Left eye exhibits no discharge.   Cardiovascular: Normal rate, regular rhythm and normal heart sounds.   Pulmonary/Chest:   Supplemental oxygen via BiPAP   Abdominal: Soft. Bowel sounds are normal. He exhibits no distension.   Musculoskeletal: He exhibits no edema.   Neurological:   Not responding to questions or following commands   Skin: Skin is dry.   Nursing note and vitals reviewed.      Significant Labs:   Microbiology Results (last 7 days)     Procedure Component Value Units Date/Time    Blood culture [[id number]] Collected:  04/11/20 1128    Order Status:  Completed Specimen:  Blood Updated:  04/12/20 1303     Blood Culture, Routine No Growth to date      No Growth to date    Blood culture [[id number]] Collected:  04/11/20 1128    Order Status:  Completed Specimen:  Blood Updated:  04/12/20 1303     Blood Culture, Routine No Growth to date      No Growth to date    Blood culture [[id number]]     Order Status:  Canceled Specimen:  Blood     Blood culture [[id number]]     Order Status:  Canceled Specimen:  Blood     Blood culture x two cultures. Draw prior to antibiotics. [[id number]]  (Abnormal) Collected:  04/10/20 0237    Order Status:  Completed Specimen:  Blood from Peripheral, Lower Arm, Right Updated:  04/11/20 0757     Blood Culture, Routine Gram stain aer bottle: Gram positive cocci in chains resembling Strep      Results called to and read back by: Xiomara Caruso RN.      Gram stain paula bottle: Gram positive cocci in chains resembling Strep       Positive results previously called      VIRIDANS STREPTOCOCCUS GROUP  Identification and susceptibility pending      Narrative:       Aerobic and anaerobic    Blood culture x two cultures. Draw prior to antibiotics. [[id number]]     Order Status:  Canceled Specimen:  Blood            Significant Imaging: I have reviewed all pertinent imaging results/findings within the past 24 hours.

## 2020-04-12 NOTE — HPI
61 y/o male with a past medical history significant for alzheimer's dementia, traumatic brain injury, hepatitis C.  He was transferred from [facility] in [place] with complaints of fever.  Oxygen saturation was 88% on room air.  COVID-19 test was positive.  He has been slowly requiring more oxygen.  I am consulted because blood cultures obtained as part of his work up returned positive for viridans strep.

## 2020-04-12 NOTE — CONSULTS
Consult received for symptom control recs. Spoke with primary team. If possible would have 2mg IV morphine q2h prn available for shortness of breath or pain. If patient requiring frequent prns or need to limit trips into the room, ok to start morphine gtt at 1mg/hr. Uptitrate as necessary. Can also have ativan 1mg IV q2h prn available for anxiety.

## 2020-04-12 NOTE — ASSESSMENT & PLAN NOTE
COVID-19 virus infection  Presented to [facility] ED on 4/10 from [facility] with high fever and O2 saturation =88% on RA. Found to be COVID+. Admitted for supportive care.      -Admit to COVID, ICU. Possible step down if continues to do well and weaning off oxygen support  -airborne and contact isolation  -continuous pulse ox  -Mask on patient at all times, particularly while staff is in the room  -Rocephin and Azithromycin for CAP coverage.   -Supportive oxygen: currently 4L NC  -tylenol and ice packs prn for fever >100.4  ---toradol injection x1 today for uncontrolled fever  -CBC, CMP, CRP, P, Mg every 48 hours  -Mildly elevated troponin on admission with no evident of acute MI on EKG. Trend troponins  -Regular diet  -TEDs, SCDs, Lovenox SQ for DVT prophylaxis

## 2020-04-12 NOTE — HPI
Principal Problem: COVID-19 virus infection.    Bossman Wheeler is a 60 y.o. Male, transferred from  for AHRF in the setting of COVID infection. PMhx of Hep C, Hep B, HTN, Dementia, and TBI who presented on 04/10/2020 to [facility] from [facility] in [place] with cc of fever. The facility noted Tmax (101.4) in addition noted hypoxic stats 88% on RA. At [facility], found to be COVID-19 POSITIVE. Admitted to inpatient service for supportive care. Started on rocephin and azithromycin started for CAP coverage. Course of his patient patient became very agitated with his baseline TBI on the floor requiring restraints and Zyprexa / Ativan. Patient declined from a respiratory standpoint with hypoxia despite being on a 100% ventimask stats 50-80%, he was transferred to ICU on 4/11 for acute hypoxic respiratory failure and agitation. Also had persistent fevers, TMAX 102.7, blood cultures ++Strep. Viridans 4/10, started on Vancomycin on 4/11 and repeat blood cultures x2. Right brachial PICC line was placed 04/11/2020.    Off note, on admission patient had mildly elevated troponin (trop 0.078) with sinus tach on EKG, Echo EF 55%, CVP 3mmHg, PASP 24mmHg, moderate TR.     04/11/2020 CXR: There is increased airspace consolidation noted within the right midlung zone.  There is also some slight increase in parenchymal opacification in the perihilar region on the left.     Lactic acid 1.4 /  /  / Cr 1.3   BCx: 1 set (04/10/2020) Strep viridans   BCx: 2 set (04/11/2020) NGTD      Patient's Spouse: Rose Mary Wheeler [phone]

## 2020-04-12 NOTE — SUBJECTIVE & OBJECTIVE
Past Medical History:   Diagnosis Date    Dementia     Hepatitis B     Hepatitis C virus infection without hepatic coma     Hypertension     Insomnia     Major neurocognitive disorder     Psychosis     TBI (traumatic brain injury)        History reviewed. No pertinent surgical history.    Review of patient's allergies indicates:  No Known Allergies    No current facility-administered medications on file prior to encounter.      Current Outpatient Medications on File Prior to Encounter   Medication Sig    amLODIPine (NORVASC) 5 MG tablet Take 5 mg by mouth 2 (two) times daily.     aspirin (ECOTRIN) 81 MG EC tablet Take 81 mg by mouth once daily.    atorvastatin (LIPITOR) 40 MG tablet Take 40 mg by mouth once daily.    divalproex (DEPAKOTE) 500 MG TbEC Take 500 mg by mouth 2 (two) times daily.    donepezil (ARICEPT) 10 MG tablet Take 10 mg by mouth every evening.    famotidine (PEPCID) 20 MG tablet Take 20 mg by mouth once daily.    lisinopriL (PRINIVIL,ZESTRIL) 5 MG tablet Take 5 mg by mouth once daily.    mirtazapine (REMERON) 15 MG tablet Take 15 mg by mouth every evening.    LORazepam (ATIVAN) 0.5 MG tablet Take 1 mg by mouth every 6 (six) hours as needed for Agitation.    memantine (NAMENDA) 10 MG Tab Take 5 mg by mouth once daily.    QUEtiapine (SEROQUEL) 50 MG tablet Take 50 mg by mouth every evening.    traZODone (DESYREL) 100 MG tablet Take 100 mg by mouth once daily.     Family History     None        Tobacco Use    Smoking status: Unknown If Ever Smoked   Substance and Sexual Activity    Alcohol use: No    Drug use: No    Sexual activity: Not on file     ROS  Objective:     Vital Signs (Most Recent):  Temp: (!) 94.5 °F (34.7 °C) (04/11/20 1600)  Pulse: 69 (04/11/20 1830)  Resp: (!) 28 (04/11/20 1830)  BP: 104/73 (04/11/20 2058)  SpO2: 99 % (04/11/20 1830) Vital Signs (24h Range):  Temp:  [94.5 °F (34.7 °C)-102.8 °F (39.3 °C)] 94.5 °F (34.7 °C)  Pulse:  [] 69  Resp:  [18-41]  28  SpO2:  [80 %-100 %] 99 %  BP: ()/(54-80) 104/73     Weight: 85.4 kg (188 lb 4.4 oz)  Body mass index is 27.01 kg/m².    SpO2: 99 %  O2 Device (Oxygen Therapy): (P) High Flow nasal Cannula      Intake/Output Summary (Last 24 hours) at 4/11/2020 2209  Last data filed at 4/11/2020 1811  Gross per 24 hour   Intake 767.76 ml   Output 1900 ml   Net -1132.24 ml       Lines/Drains/Airways     Peripherally Inserted Central Catheter Line            PICC Triple Lumen 04/11/20 1245 right basilic less than 1 day          Drain                 NG/OG Tube 04/11/20 1232 16 Fr. Right nostril less than 1 day         Urethral Catheter 04/11/20 1000 less than 1 day          Peripheral Intravenous Line                 Peripheral IV - Single Lumen 04/10/20 0240 20 G Right;Distal Forearm 1 day         Peripheral IV - Single Lumen 04/11/20 1102 18 G Left;Posterior Hand less than 1 day                Physical Exam    Significant Labs:   BMP:   Recent Labs   Lab 04/10/20  0239 04/11/20  1128   * 131*    144   K 3.9 3.9    110   CO2 27 23   BUN 19 25*   CREATININE 1.2 1.3   CALCIUM 8.2* 7.5*   MG 1.9  --    , CMP   Recent Labs   Lab 04/10/20  0239 04/11/20  1128    144   K 3.9 3.9    110   CO2 27 23   * 131*   BUN 19 25*   CREATININE 1.2 1.3   CALCIUM 8.2* 7.5*   PROT 6.9 6.5   ALBUMIN 2.9* 2.5*   BILITOT 0.4 0.2   ALKPHOS 54* 44*   AST 20 38   ALT 32 30   ANIONGAP 9 11   ESTGFRAFRICA >60 >60   EGFRNONAA >60 58*   , CBC   Recent Labs   Lab 04/10/20  0239 04/11/20  1128   WBC 9.38 8.72   HGB 10.7* 9.3*   HCT 34.7* 29.7*    155   , INR No results for input(s): INR, PROTIME in the last 48 hours. and Lipid Panel No results for input(s): CHOL, HDL, LDLCALC, TRIG, CHOLHDL in the last 48 hours.    Significant Imaging: Echocardiogram:   Transthoracic echo (TTE) complete (Cupid Only):   Results for orders placed or performed during the hospital encounter of 04/10/20   Echo Color Flow Doppler? Yes    Result Value Ref Range    LA WIDTH 4.11 cm    AORTIC VALVE CUSP SEPERATION 2.22 cm    PV PEAK VELOCITY 0.61 cm/s    LVIDD 4.78 3.5 - 6.0 cm    IVS 1.02 0.6 - 1.1 cm    PW 0.94 0.6 - 1.1 cm    Ao root annulus 3.44 cm    LVIDS 3.50 2.1 - 4.0 cm    FS 27 28 - 44 %    LA volume 44.16 cm3    Sinus 2.53 cm    STJ 2.32 cm    Ascending aorta 1.83 cm    LV mass 164.48 g    LA size 2.79 cm    RVDD 4.35 cm    TAPSE 1.90 cm    Left Ventricle Relative Wall Thickness 0.39 cm    AV Velocity Ratio 0.77     E/A ratio 1.71     E wave decelartion time 242.40 msec    IVRT 145.33 msec    Pulm vein S/D ratio 1.54     LVOT diameter 1.95 cm    LVOT area 3.0 cm2    LVOT peak naman 0.88 m/s    LVOT peak VTI 20.88 cm    Ao peak naman 1.15 m/s    LVOT stroke volume 62.33 cm3    AV peak gradient 5 mmHg    MV Peak E Naman 0.89 m/s    TR Max Naman 2.28 m/s    MV Peak A Naman 0.52 m/s    PV Peak S Naman 0.40 m/s    PV Peak D Naman 0.26 m/s    LV Systolic Volume 50.99 mL    LV Systolic Volume Index 25.1 mL/m2    LV Diastolic Volume 106.62 mL    LV Diastolic Volume Index 52.41 mL/m2    LA Volume Index 21.7 mL/m2    LV Mass Index 81 g/m2    RA Major Axis 5.11 cm    Left Atrium Minor Axis 4.39 cm    Left Atrium Major Axis 4.68 cm    Triscuspid Valve Regurgitation Peak Gradient 21 mmHg    RA Width 4.21 cm    Right Atrial Pressure (from IVC) 3 mmHg    TV rest pulmonary artery pressure 24 mmHg    Narrative    · Normal left ventricular systolic function. The estimated ejection   fraction is 55%.  · Normal LV diastolic function.  · Normal right ventricular systolic function.  · Mild right atrial enlargement.  · Moderate tricuspid regurgitation.  · No pulmonary hypertension present.  · Normal central venous pressure (3 mmHg).  · The estimated PA systolic pressure is 24 mmHg.

## 2020-04-12 NOTE — NURSING TRANSFER
Nursing Transfer Note      4/11/2020     Transfer To: [facility] to [facility] 3065    Transfer via stretcher    Transfer with to O2, cardiac monitoring, Precedex drip, Shin Catheter, Right nare NGT    Transported by EMS    Medicines sent: Precedex infusion    Chart send with patient: Yes    Notified: spouse at 2140    Report called to nurse taking patient arriving to room 3065 at 2135

## 2020-04-12 NOTE — PROGRESS NOTES
[facility]  COVID-19 ICU DAILY PROGRESS NOTE    Patient Name: Bossman Wheeler  MRN: [id number]  Admission Date: 4/10/2020   Code Status: DNR  Attending: Conner Kemp*     SUBJECTIVE:    Bossman Wheeler is a 60 y.o. Male, transferred from  for AHRF in the setting of COVID infection. PMhx of Hep C, Hep B, HTN, Dementia, and TBI who presented on 04/10/2020 to [facility] from [facility] in [place] with cc of fever. The facility noted Tmax (101.4) in addition noted hypoxic stats 88% on RA. At [facility], found to be COVID-19 POSITIVE. Admitted to inpatient service for supportive care. Started on rocephin and azithromycin for CAP coverage. Course of his admission patient became very agitated with his baseline TBI on the floor requiring restraints and Zyprexa / Ativan. Patient declined from a respiratory standpoint with hypoxia despite being on a 100% ventimask stats 50-80%, he was transferred to ICU on 4/11 for acute hypoxic respiratory failure and agitation. Also had persistent fevers, TMAX 102.7, blood cultures ++Strep. Viridans 4/10, started on Vancomycin on 4/11 and repeat blood cultures x2.  Right brachial PICC line was placed 04/11/2020.    Overnight events/Interval History: Hospital day #2. Work of breathing increasing despite BiPAP support. Dr. Galeas contacted family and patient is mentally diminished with dementia following a TBI.  Wife would not like to have CPR/intubation.  Pain and palliative care consulted to keep patient comfortable.  Will continue to give covid therapy but no advanced measures.     OBJECTIVE: Limited review of systems and physical exam per team attending and/or nursing staff due to patient being in special isolation.     ROS     Vitals:   Vitals:    04/12/20 0430 04/12/20 0433 04/12/20 0445 04/12/20 0700   BP:   (!) 89/60 (!) 83/62   BP Location:    Left arm   Patient Position:    Lying   Pulse: 65 66 62 70   Resp: (!) 39 (!)  30 (!) 32 (!) 33   Temp:   (!) 94.9 °F (34.9 °C) 97.6 °F (36.4 °C)   TempSrc:   Axillary Axillary   SpO2: (!) 89% (!) 89% (!) 91% (!) 92%   Weight:       Height:           Oxygen Concentration (%):  [1-80] 80           Physical Exam     Labs: All labs within the last 24 hours were reviewed.     Imaging: All imaging within the last 24 hours was reviewed.     ASSESSMENT & PLAN:   This is a 60 y.o. admitted on 4/10/2020 for respiratory failure secondary to confirmed/suspected COVID-19 requiring mechanical ventilation for respiratory support.     Neuro:   -Currently on BiPAP and sedated. Arouses to voice.    Cardiac/Circulatory:   -PICC line placed 4/11.   -EF 55% by TTE yesterday  -ECG with normal QTc and ECG ordered for tomorrow to f/u after starting plaquenil and azithromycin.    Pulmonary:   -Continue BiPAP with ARDS targeted strategies.   -Continue closed ventilator circuit and avoid aerosol generating procedures.   -COVID-19 positive 4/10.   -PLAQUENIL started today and check for compassionate use of anti-retrovirals.   -Daily CRP, d-dimer, ferritin, and CK-MB to trend inflammatory response.    -Strict I/O's and goal net neutral status to help optimize respiratory status  -Special isolation and aspiration precautions ordered.   -Update family daily.     Renal:   -Monitoring urine output   -Trending renal function daily.   -Strict I/O's.     Gastrointestinal:   -Trending liver function daily.   -Can not feed on BiPAP d/t high risk for aspiration    Endocrine:   -Hold home oral diabetic agents.   -Goal glucose 140-180  -POCT glucose checks Q6H while NPO.   -Sliding scale insulin and/or insulin drip ordered, if necessary for goal.     ID:   -Blood cx from 4/10 positive for viridans streptococcus.  ID consulted.  Spoke with Dr. Abhishek Lomas.     Hematology/Oncology:   -Trending CBC daily and monitor for signs of bleeding.   - H/H today 11.3/36.6.  Transfuse for Hgb <7.     ICU DAILY CHECKLIST:   Awake with RASS goal of  0? Yes   Spontaneous breathing trial performed? N/A   SAT and SBT coordinated? N/A   Head of bed >30 degrees? Yes   Tube feeding initiated? No, on BiPAP   Glucose at goal? Yes, BG <200   Having bowel movements daily?  Yes, last BM 4/11   Stress ulcer prophylaxis ordered? Yes   DVT prophylaxis ordered? Yes, enoxaparin   Can indwelling lines be discontinued? No   Can antimicrobials be discontinued? No, started today   Family updated? Yes, Dr. Galeas spoke with his wife

## 2020-04-12 NOTE — ASSESSMENT & PLAN NOTE
Acute hypoxemic respiratory failure  From above.  100% non re-breather and drops sats in mid 80's with increased work of breathing. Will intubate.  May have a component of CHF with BNP > 500.  Will give dose of lasix now.  Cxr now. Echo ordered.

## 2020-04-12 NOTE — ASSESSMENT & PLAN NOTE
Blood cultures from 4/10 Strep Viridans (1 set),    - Possible contaminant   - PICC Line placed 04/11/2020    - Follow up blood cultures x 2.   - Given Rocephin in ED (04/10/2020) then started on Vancomycin thereafter    - Will reculture post PICC line placement, did not have clear cultures before this was placed  - Continue Dank

## 2020-04-12 NOTE — PROGRESS NOTES
I spoke with pts wife Rose Mary Wheeler to discuss his clinical condition- this am, pt remains tachypenic with RR ~40 and somnolent on bipap- concerned about progression to respiratory arrest    I discussed pts baseline condition with his wife who states he has alzheimers, lives in a home, knows who she is and no one else.      In light of his baseline functional and mental capacity she prefers that he die peacefully rather than undergo a prolonged ICU stay with high expected mortality. She agrees to make pt DNR/DNI    She herself has bronchitis and can not come see him, but askes that I relay this conversation to pts sister which I am happy to do.    Shira Galeas MD    Addendum:  Spoke with pts mother in [place] (yvonne Wheeler)  Who also agrees with above. Spoke with brother Stanford Wheeler and he agrees as well. He is unable to come as he fears exposing his wife.

## 2020-04-12 NOTE — NURSING
Transferred to CICU room 65, transported by 2 EMTs on arrival patient's O2 sats 85% on 10L hiflow. Patient transferred from stretcher to ICU bed complete assist, placed on monitor sinus aaron 57bpm. B/p 95/69, resps 35, 94% on 10L hiflow.  Noncoherent speech pattern, hx of mult. Neurological deficits, LS coarse and diminished. Deep oropharyngeal suctioning produce moderate amount of thick brown sputum, speech and LS clearer after suctioning. unable obtain oral or axillary temp, rectal temp 98.7F,  20g PIV to right forearm flushes w/o difficulty saline locked, 18g PIV to left wrist flushed and saline locked. Triple lumen PICC to RUE w/ one occluded port. Precedex infusing at 1.2 mcg/kg/h. Shin patent and intact - 180 conc. Yellow urine emptied. NG secured, clamped and intact to right nostril. Sacral dressing applied. MD notified of arrival, present at bedside. Staff will continue to monitor and assist.

## 2020-04-12 NOTE — NURSING
[facility] and patient will be enroute. Also spoke with patient's spouse that patient is enroute to main campus at this time.

## 2020-04-13 NOTE — PROGRESS NOTES
Pharmacokinetic Assessment Follow Up: IV Vancomycin    Vancomycin serum concentration assessment(s):    The trough level was drawn correctly and can be used to guide therapy at this time. The measurement is below the desired definitive target range of 15 to 20 mcg/mL.    Vancomycin Regimen Plan:    Change regimen to Vancomycin 1500 mg IV every 12 hours with next serum trough concentration measured at 0000 prior to 5th dose on 4/15    Drug levels (last 3 results):  Recent Labs   Lab Result Units 04/13/20  1127   Vancomycin-Trough ug/mL 6.8*       Pharmacy will continue to follow and monitor vancomycin.    Please contact pharmacy at extension [phone] for questions regarding this assessment.    Thank you for the consult,   Narendra Wynn       Patient brief summary:  Bossman Wheeler is a 60 y.o. male initiated on antimicrobial therapy with IV Vancomycin for treatment of lower respiratory infection    The patient's current regimen is 1750mg q 24hours.    Drug Allergies:   Review of patient's allergies indicates:  No Known Allergies    Actual Body Weight:   85.4kg    Renal Function:   Estimated Creatinine Clearance: 65.9 mL/min (based on SCr of 1.2 mg/dL).,     Dialysis Method (if applicable):  N/A    CBC (last 72 hours):  Recent Labs   Lab Result Units 04/11/20  1128 04/12/20  0244 04/13/20  0318   WBC K/uL 8.72 10.45 16.31*   Hemoglobin g/dL 9.3* 11.3* 11.4*   Hematocrit % 29.7* 36.6* 35.8*   Platelets K/uL 155 175 208   Gran% % 81.9* 89.0* 90.0*   Lymph% % 6.3* 4.3* 5.0*   Mono% % 10.4 5.2 5.0   Eosinophil% % 0.0 0.0 0.0   Basophil% % 0.1 0.3 0.0   Differential Method  Automated Automated Manual       Metabolic Panel (last 72 hours):  Recent Labs   Lab Result Units 04/11/20  1128 04/11/20  1200 04/12/20  0244 04/13/20  0454   Sodium mmol/L 144  --  141 141   Potassium mmol/L 3.9  --  3.9 4.0   Chloride mmol/L 110  --  105 104   CO2 mmol/L 23  --  27 28   Glucose mg/dL 131*  --  162* 129*   Glucose, UA   --   Negative  --   --    BUN, Bld mg/dL 25*  --  28* 21   Creatinine mg/dL 1.3  --  0.9 1.2   Albumin g/dL 2.5*  --  2.2* 1.7*   Total Bilirubin mg/dL 0.2  --  0.2 0.2   Alkaline Phosphatase U/L 44*  --  47* 48*   AST U/L 38  --  48* 53*   ALT U/L 30  --  34 40   Magnesium mg/dL  --   --  2.1 2.1   Phosphorus mg/dL  --   --  3.3 3.7       Vancomycin Administrations:  vancomycin given in the last 96 hours                   vancomycin (VANCOCIN) 1,750 mg in dextrose 5 % 500 mL IVPB (mg) 1,750 mg New Bag 04/13/20 1228     1,750 mg New Bag 04/12/20 1424    vancomycin 1.25 g in dextrose 5% 250 mL IVPB (ready to mix) (mg) 1,250 mg New Bag 04/11/20 1204                Microbiologic Results:  Microbiology Results (last 7 days)     Procedure Component Value Units Date/Time    Blood culture x two cultures. Draw prior to antibiotics. [[id number]]  (Abnormal)  (Susceptibility) Collected:  04/10/20 0237    Order Status:  Completed Specimen:  Blood from Peripheral, Lower Arm, Right Updated:  04/13/20 0812     Blood Culture, Routine Gram stain aer bottle: Gram positive cocci in chains resembling Strep      Results called to and read back by: Xiomara Caruso RN.      Gram stain paula bottle: Gram positive cocci in chains resembling Strep       Positive results previously called      VIRIDANS STREPTOCOCCUS GROUP    Narrative:       Aerobic and anaerobic    Blood culture [[id number]] Collected:  04/11/20 1128    Order Status:  Completed Specimen:  Blood Updated:  04/12/20 1303     Blood Culture, Routine No Growth to date      No Growth to date    Blood culture [[id number]] Collected:  04/11/20 1128    Order Status:  Completed Specimen:  Blood Updated:  04/12/20 1303     Blood Culture, Routine No Growth to date      No Growth to date    Blood culture [[id number]]     Order Status:  Canceled Specimen:  Blood     Blood culture [[id number]]     Order Status:  Canceled Specimen:  Blood     Blood culture x two cultures. Draw prior to antibiotics.  [[id number]]     Order Status:  Canceled Specimen:  Blood

## 2020-04-13 NOTE — PROGRESS NOTES
[facility]  COVID-19 ICU DAILY PROGRESS NOTE    Patient Name: Bossman Wheeler  MRN: [id number]  Admission Date: 4/10/2020   Code Status:   Attending: Conner Kemp*     SUBJECTIVE:    HPI  Bossman Wheeler is a 60 y.o. Male, transferred from  for AHRF in the setting of COVID infection. PMhx of Hep C, Hep B, HTN, Dementia, and TBI who presented on 04/10/2020 to [facility] from [facility] in [place] with cc of fever. The facility noted Tmax (101.4) in addition noted hypoxic stats 88% on RA. At [facility], found to be COVID-19 POSITIVE. Admitted to inpatient service for supportive care. Started on rocephin and azithromycin for CAP coverage. Course of his admission patient became very agitated with his baseline TBI on the floor requiring restraints and Zyprexa / Ativan. Patient declined from a respiratory standpoint with hypoxia despite being on a 100% ventimask stats 50-80%, he was transferred to ICU on 4/11 for acute hypoxic respiratory failure and agitation. Also had persistent fevers, TMAX 102.7, blood cultures ++Strep. Viridans 4/10, started on Vancomycin on 4/11 and repeat blood cultures x2.  Right brachial PICC line was placed 04/11/2020.    Hospital Course: Bossman Wheeler was admitted to ICU 4/12 for management of respiratory failure secondary to suspected and/or confirmed COVID-19 requiring mechanical ventilation for respiratory support.     Overnight events/Interval History: Hospital day #3. Mr. Wheeler remains on BiPAP but is requiring less support.  His Fi02 has decreased from 80% to 40% and his sats are in the 90s.  ID changed his ceftriaxone from 1 to 2 gm daily for the viridans strep.  He is currently unable to be fed d/t the risk for aspiration while on BiPAP.  His inflammatory markers are elevated.  He was started on azithro/plauenil on 4/12 and his QTc is shorter today than yesterday's baseline.    ECG ST with PVCs    OBJECTIVE: Limited  review of systems and physical exam per team attending and/or nursing staff due to patient being in special isolation.     ROS     Vitals:   Vitals:    04/13/20 0045 04/13/20 0100 04/13/20 0132 04/13/20 0311   BP: (!) 91/58 91/61 (!) 81/52    Pulse: 88 89 81    Resp: (!) 29 (!) 30 (!) 29    Temp:       TempSrc:       SpO2: 95% (!) 94% (!) 94% (!) 94%   Weight:       Height:           Oxygen Concentration (%):  [40-80] 40           Physical Exam     Labs: All labs within the last 24 hours were reviewed.     Imaging: All imaging within the last 24 hours was reviewed.     ASSESSMENT & PLAN:     Neuro:   -Currently on BiPAP and sedated. Arouses to voice.     Cardiac/Circulatory:   -PICC line placed 4/11.   -EF 55% by TTE yesterday     Pulmonary:   -Continue BiPAP with ARDS targeted strategies.   -Continue closed ventilator circuit and avoid aerosol generating procedures.   -COVID-19 positive 4/10.   -PLAQUENIL and azithromycin started 4/12. QTc on 4/12 502 ms and today QTc is 461 ms  -Daily CRP, d-dimer, ferritin, and CK-MB to trend inflammatory response.    -Strict I/O's and goal net neutral status to help optimize respiratory status  -Special isolation and aspiration precautions ordered.   -Update family daily.      Renal:   -Monitoring urine output   -Trending renal function daily.   -Strict I/O's.      Gastrointestinal:   -Trending liver function daily.   -Can not feed on BiPAP d/t high risk for aspiration  -AST/ALT trending up, 48/53     Endocrine:   -Hold home oral diabetic agents.   -Goal glucose 140-180  -POCT glucose checks Q6H while NPO.   -Sliding scale insulin and/or insulin drip ordered, if necessary for goal.      ID:   -Blood cx from 4/10 positive for viridans streptococcus.  ID consulted.  Dr. Abhishek Lomas recommended starting ceftriaxone 2 gm daily with follow up blood cultures.    -Ferritin is 940, D dimer 2.21 and >294    Hematology/Oncology:   -Trending CBC daily and monitor for signs of  bleeding.   - H/H today 11.4/35.8.  Transfuse for Hgb <7.     ICU DAILY CHECKLIST:   Awake with RASS goal of 0? RASS -1   Spontaneous breathing trial performed? No   SAT and SBT coordinated? N/A   Head of bed >30 degrees? Yes   Tube feeding initiated? No, on BiPAP   Glucose at goal? Yes, BG <200   Having bowel movements daily?  No, Last BM 4/11   Stress ulcer prophylaxis ordered? Yes   DVT prophylaxis ordered? Yes, enoxaparin   Can indwelling lines be discontinued? No   Can antimicrobials be discontinued? No   Family updated? Spoke with his wife

## 2020-04-14 NOTE — SUBJECTIVE & OBJECTIVE
Interval History:     Remains critically ill.    Review of Systems   Unable to perform ROS: Dementia     Objective:     Vital Signs (Most Recent):  Temp: 97 °F (36.1 °C) (04/13/20 1500)  Pulse: 83 (04/13/20 2053)  Resp: (!) 30 (04/13/20 2053)  BP: (!) 71/51 (04/13/20 1930)  SpO2: 95 % (04/13/20 2053) Vital Signs (24h Range):  Temp:  [97 °F (36.1 °C)-99.9 °F (37.7 °C)] 97 °F (36.1 °C)  Pulse:  [77-94] 83  Resp:  [23-36] 30  SpO2:  [90 %-97 %] 95 %  BP: ()/() 71/51     Weight: 85.4 kg (188 lb 4.4 oz)  Body mass index is 27.01 kg/m².    Estimated Creatinine Clearance: 65.9 mL/min (based on SCr of 1.2 mg/dL).    Physical Exam   HENT:   Supplemental oxygen via BiPAP   Eyes: Right eye exhibits no discharge. Left eye exhibits no discharge.   Cardiovascular: Normal rate, regular rhythm and normal heart sounds.   Pulmonary/Chest:   Supplemental oxygen via BiPAP   Abdominal: Soft. Bowel sounds are normal. He exhibits no distension.   Musculoskeletal: He exhibits no edema.   Neurological:   Not responding to questions or following commands   Skin: Skin is dry.   Nursing note and vitals reviewed.      Significant Labs:   Microbiology Results (last 7 days)     Procedure Component Value Units Date/Time    Blood culture [[id number]] Collected:  04/11/20 1128    Order Status:  Completed Specimen:  Blood Updated:  04/13/20 1303     Blood Culture, Routine No Growth to date      No Growth to date      No Growth to date    Blood culture [[id number]] Collected:  04/11/20 1128    Order Status:  Completed Specimen:  Blood Updated:  04/13/20 1303     Blood Culture, Routine No Growth to date      No Growth to date      No Growth to date    Blood culture x two cultures. Draw prior to antibiotics. [[id number]]  (Abnormal)  (Susceptibility) Collected:  04/10/20 0237    Order Status:  Completed Specimen:  Blood from Peripheral, Lower Arm, Right Updated:  04/13/20 0812     Blood Culture, Routine Gram stain aer bottle: Gram positive  cocci in chains resembling Strep      Results called to and read back by: Xiomara Caruso RN.      Gram stain paula bottle: Gram positive cocci in chains resembling Strep       Positive results previously called      VIRIDANS STREPTOCOCCUS GROUP    Narrative:       Aerobic and anaerobic    Blood culture [[id number]]     Order Status:  Canceled Specimen:  Blood     Blood culture [[id number]]     Order Status:  Canceled Specimen:  Blood     Blood culture x two cultures. Draw prior to antibiotics. [[id number]]     Order Status:  Canceled Specimen:  Blood           Significant Imaging: I have reviewed all pertinent imaging results/findings within the past 24 hours.

## 2020-04-14 NOTE — PROGRESS NOTES
[facility]  Cardiology  Progress Note    Patient Name: Bossman Wheeler  MRN: [id number]  Admission Date: 4/10/2020  Hospital Length of Stay: 4 days  Code Status: DNR   Attending Physician: Conner Kemp*   Primary Care Physician: Primary Doctor No  Expected Discharge Date:   Principal Problem:COVID-19 virus infection    Subjective:     Hospital Course:   Patient presented on 04/10/2020 to [facility] from [facility] in [place] with cc of fever. The facility noted Tmax (101.4) in addition noted hypoxic stats 88% on RA.  Admitted to inpatient service 4/11/2020 for supportive care. Started on rocephin and azithromycin for CAP coverage. Course of his admission patient became very agitated with his baseline TBI on the floor requiring restraints and Zyprexa / Ativan. Patient declined from a respiratory standpoint with hypoxia despite being on a 100% ventimask stats 50-80%, he was transferred to ICU on 4/11 for acute hypoxic respiratory failure and agitation. Also had persistent fevers, TMAX 102.7, blood cultures ++Strep. Viridans 4/10, started on Vancomycin on 4/11 and repeat blood cultures x2.  Right brachial PICC line was placed 04/11/2020.   4/12/2020: He was started on azithro/plaquenil on 4/12.   4/13/2020:  ID changed his ceftriaxone from 1 to 2 gm daily for the viridans strep.  He is currently unable to be fed d/t the risk for aspiration while on BiPAP.  His inflammatory markers are elevated.      Interval History: Patient on BiPAP on FiO2 100% 18/8 oxygen saturation in mid-upper 80s. CVP 14. Started lasix 40mg BID for added diuresis. Good urine output. Net negative 950 past 24 hrs. Remains on morphine gtt for comfort. Precedex for sedation. On levophed 0.05. S. viridans on BC. On vancomycin, azithromycin, ceftriaxone. On plaquenil, QTc 460. Spoke with wife and updated her on patient's condition. Patient is DNR/DNI.      ROS  Objective:     Vital Signs (Most  Recent):  Temp: 98.1 °F (36.7 °C) (04/14/20 1200)  Pulse: 87 (04/14/20 1300)  Resp: (!) 25 (04/14/20 1300)  BP: (!) 87/59 (04/14/20 1300)  SpO2: (!) 86 % (04/14/20 1300) Vital Signs (24h Range):  Temp:  [93.8 °F (34.3 °C)-98.1 °F (36.7 °C)] 98.1 °F (36.7 °C)  Pulse:  [76-88] 87  Resp:  [20-38] 25  SpO2:  [83 %-95 %] 86 %  BP: ()/(51-78) 87/59     Weight: 85.4 kg (188 lb 4.4 oz)  Body mass index is 27.01 kg/m².     SpO2: (!) 86 %  O2 Device (Oxygen Therapy): BiPAP      Intake/Output Summary (Last 24 hours) at 4/14/2020 1320  Last data filed at 4/14/2020 1212  Gross per 24 hour   Intake 1009.78 ml   Output 1960 ml   Net -950.22 ml       Lines/Drains/Airways     Peripherally Inserted Central Catheter Line            PICC Triple Lumen 04/11/20 1245 right basilic 3 days          Drain                 NG/OG Tube 04/11/20 1232 16 Fr. Right nostril 3 days         Urethral Catheter 04/11/20 1000 3 days          Peripheral Intravenous Line                 Peripheral IV - Single Lumen 04/10/20 0240 20 G Right;Distal Forearm 4 days         Peripheral IV - Single Lumen 04/11/20 1102 18 G Left;Posterior Hand 3 days              Limited review of systems and physical exam per team attending and/or nursing staff due to patient being in special isolation.     Significant Labs: All pertinent lab results from the last 24 hours have been reviewed.    Significant Imaging: All recent imaging reviewed.    Assessment and Plan:     Brief HPI: Bossman Wheeler was admitted to ICU 4/12 for management of respiratory failure secondary to suspected and/or confirmed COVID-19 requiring mechanical ventilation for respiratory support.     Neuro:   -Currently on BiPAP and sedated on morphine gtt for comfort. Arouses to voice.  -Precedex for sedation.  -Patient DNR/DNI     Cardiac/Circulatory:   -PICC line placed 4/11.   -EF 55% by TTE 4/12/2020  -On levophed gtt titrated to goal of MAP >65.     Pulmonary:   -Continue BiPAP with ARDS  targeted strategies.   -Continue closed ventilator circuit and avoid aerosol generating procedures.   -COVID-19 positive 4/10.   -PLAQUENIL and azithromycin started 4/12. QTc on 4/12 502 ms and QTc 4/13/2020 is 461  -Daily CRP, d-dimer, ferritin, and CK-MB to trend inflammatory response. Mildly increased today.   -Strict I/O's and goal net neutral status to help optimize respiratory status  -Lasix added to regimen to help decrease oxygen demand  -Special isolation and aspiration precautions ordered.   -Update family daily.      Renal:   -Monitoring urine output   -Trending renal function daily. Creatinine 0.9.  -Strict I/O's.      Gastrointestinal:   -Trending liver function daily.   -Can not feed on BiPAP d/t high risk for aspiration  -AST/ALT trending up, 48/53 4/13/2020     Endocrine:   -Hold home oral diabetic agents.   -Goal glucose 140-180. .  -POCT glucose checks Q6H while NPO.   -Sliding scale insulin and/or insulin drip ordered, if necessary for goal.      ID:   -Blood cx from 4/10 positive for viridans streptococcus.  ID consulted.  Dr. Abhishek Lomas recommended starting ceftriaxone 2 gm daily with follow up blood cultures.    -BCx2 drawn this AM 4/14/2020.  -Ferritin 940>1077, D dimer 2.21 and >294     Hematology/Oncology:   -Trending CBC daily and monitor for signs of bleeding.   - H/H today 11.8/38.5.  Transfuse for Hgb <7.     VTE Risk Mitigation (From admission, onward)         Ordered     enoxaparin injection 40 mg  Daily      04/10/20 1329     IP VTE LOW RISK PATIENT  Once      04/10/20 0610     Place PRAVEENA hose  Until discontinued      04/10/20 0610     Place sequential compression device  Until discontinued      04/10/20 0610                Mary Phipps NP  Cardiology  [facility]  STAFF MD: Shira Galeas MD

## 2020-04-14 NOTE — SUBJECTIVE & OBJECTIVE
Interval History: Patient on BiPAP on FiO2 100% 18/8 oxygen saturation in mid-upper 80s. CVP 14. Started lasix 40mg BID for added diuresis. Good urine output. Net negative 950 past 24 hrs. Remains on morphine gtt for comfort.  On levophed 0.05. S. viridans on BC. On vancomycin, azithromycin, ceftriaxone. On plaquenil, QTc 460. Spoke with wife and updated her on patient's condition. Patient is DNR/DNI.      ROS  Objective:     Vital Signs (Most Recent):  Temp: 98.1 °F (36.7 °C) (04/14/20 1200)  Pulse: 87 (04/14/20 1300)  Resp: (!) 25 (04/14/20 1300)  BP: (!) 87/59 (04/14/20 1300)  SpO2: (!) 86 % (04/14/20 1300) Vital Signs (24h Range):  Temp:  [93.8 °F (34.3 °C)-98.1 °F (36.7 °C)] 98.1 °F (36.7 °C)  Pulse:  [76-88] 87  Resp:  [20-38] 25  SpO2:  [83 %-95 %] 86 %  BP: ()/(51-78) 87/59     Weight: 85.4 kg (188 lb 4.4 oz)  Body mass index is 27.01 kg/m².     SpO2: (!) 86 %  O2 Device (Oxygen Therapy): BiPAP      Intake/Output Summary (Last 24 hours) at 4/14/2020 1320  Last data filed at 4/14/2020 1212  Gross per 24 hour   Intake 1009.78 ml   Output 1960 ml   Net -950.22 ml       Lines/Drains/Airways     Peripherally Inserted Central Catheter Line            PICC Triple Lumen 04/11/20 1245 right basilic 3 days          Drain                 NG/OG Tube 04/11/20 1232 16 Fr. Right nostril 3 days         Urethral Catheter 04/11/20 1000 3 days          Peripheral Intravenous Line                 Peripheral IV - Single Lumen 04/10/20 0240 20 G Right;Distal Forearm 4 days         Peripheral IV - Single Lumen 04/11/20 1102 18 G Left;Posterior Hand 3 days              Limited review of systems and physical exam per team attending and/or nursing staff due to patient being in special isolation.     Significant Labs: All pertinent lab results from the last 24 hours have been reviewed.    Significant Imaging: All recent imaging reviewed.

## 2020-04-14 NOTE — PROGRESS NOTES
[facility]  Infectious Disease  Progress Note    Patient Name: Bossman Wheeler  MRN: [id number]  Admission Date: 4/10/2020  Length of Stay: 3 days  Attending Physician: Conner Kemp*  Primary Care Provider: Primary Doctor No    Isolation Status: Airborne and Contact and Droplet  Assessment/Plan:      * COVID-19 virus infection  61 y/o male with a past medical history significant for alzheimer's dementia, traumatic brain injury, hepatitis C.  He was transferred from [facility] in [place] with complaints of fever.  Oxygen saturation was 88% on room air.  COVID-19 test was positive.  He has been slowly requiring more oxygen.  Patient is DNR/DNI.  Continue with supportive care.  Continue hydroxychloroquine.    Bacteremia  61 y/o male admitted with COVID-19.   Blood cultures were 2/2 positive for viridans strep.  Source unclear.  This pathogen is often seen in the mouth and in the GI tract.  Viridans strep bacteremia.  Repeat blood cultures negative.  Recommend 2 weeks of IV antibiotics.  Stop date would be April 25, 2020.  If he is discharged prior to his stop date, then cbc and cmp should be checked every week and faxed to ID clinic at [phone].        Anticipated Disposition: TBD    Thank you for your consult. I will sign off. Please contact us if you have any additional questions.    Rahat Lomas MD  Infectious Disease  [facility]    Subjective:     Principal Problem:COVID-19 virus infection    HPI: 61 y/o male with a past medical history significant for alzheimer's dementia, traumatic brain injury, hepatitis C.  He was transferred from [facility] in [place] with complaints of fever.  Oxygen saturation was 88% on room air.  COVID-19 test was positive.  He has been slowly requiring more oxygen.  I am consulted because blood cultures obtained as part of his work up returned positive for viridans strep.    Interval History:      Remains critically ill.    Review of Systems   Unable to perform ROS: Dementia     Objective:     Vital Signs (Most Recent):  Temp: 97 °F (36.1 °C) (04/13/20 1500)  Pulse: 83 (04/13/20 2053)  Resp: (!) 30 (04/13/20 2053)  BP: (!) 71/51 (04/13/20 1930)  SpO2: 95 % (04/13/20 2053) Vital Signs (24h Range):  Temp:  [97 °F (36.1 °C)-99.9 °F (37.7 °C)] 97 °F (36.1 °C)  Pulse:  [77-94] 83  Resp:  [23-36] 30  SpO2:  [90 %-97 %] 95 %  BP: ()/() 71/51     Weight: 85.4 kg (188 lb 4.4 oz)  Body mass index is 27.01 kg/m².    Estimated Creatinine Clearance: 65.9 mL/min (based on SCr of 1.2 mg/dL).    Physical Exam   HENT:   Supplemental oxygen via BiPAP   Eyes: Right eye exhibits no discharge. Left eye exhibits no discharge.   Cardiovascular: Normal rate, regular rhythm and normal heart sounds.   Pulmonary/Chest:   Supplemental oxygen via BiPAP   Abdominal: Soft. Bowel sounds are normal. He exhibits no distension.   Musculoskeletal: He exhibits no edema.   Neurological:   Not responding to questions or following commands   Skin: Skin is dry.   Nursing note and vitals reviewed.      Significant Labs:   Microbiology Results (last 7 days)     Procedure Component Value Units Date/Time    Blood culture [[id number]] Collected:  04/11/20 1128    Order Status:  Completed Specimen:  Blood Updated:  04/13/20 1303     Blood Culture, Routine No Growth to date      No Growth to date      No Growth to date    Blood culture [[id number]] Collected:  04/11/20 1128    Order Status:  Completed Specimen:  Blood Updated:  04/13/20 1303     Blood Culture, Routine No Growth to date      No Growth to date      No Growth to date    Blood culture x two cultures. Draw prior to antibiotics. [[id number]]  (Abnormal)  (Susceptibility) Collected:  04/10/20 0237    Order Status:  Completed Specimen:  Blood from Peripheral, Lower Arm, Right Updated:  04/13/20 0812     Blood Culture, Routine Gram stain aer bottle: Gram positive cocci in chains  resembling Strep      Results called to and read back by: Xiomara Caruso RN.      Gram stain paula bottle: Gram positive cocci in chains resembling Strep       Positive results previously called      VIRIDANS STREPTOCOCCUS GROUP    Narrative:       Aerobic and anaerobic    Blood culture [[id number]]     Order Status:  Canceled Specimen:  Blood     Blood culture [[id number]]     Order Status:  Canceled Specimen:  Blood     Blood culture x two cultures. Draw prior to antibiotics. [[id number]]     Order Status:  Canceled Specimen:  Blood           Significant Imaging: I have reviewed all pertinent imaging results/findings within the past 24 hours.

## 2020-04-14 NOTE — PLAN OF CARE
04/14/20 1406   Discharge Reassessment   Assessment Type Discharge Planning Reassessment   Do you have any problems affording any of your prescribed medications? No   Discharge Plan A New Nursing Home placement - snf care facility   Discharge Plan B Other  (IP Psych)

## 2020-04-14 NOTE — ASSESSMENT & PLAN NOTE
61 y/o male with a past medical history significant for alzheimer's dementia, traumatic brain injury, hepatitis C.  He was transferred from [facility] in [place] with complaints of fever.  Oxygen saturation was 88% on room air.  COVID-19 test was positive.  He has been slowly requiring more oxygen.  Patient is DNR/DNI.  Continue with supportive care.  Continue hydroxychloroquine.

## 2020-04-14 NOTE — NURSING
Mary Phipps NP notified of patient's SaO2 in the low 80's after being suctioned and not continuing to improve. Order will be put in for Lasix.

## 2020-04-14 NOTE — ASSESSMENT & PLAN NOTE
59 y/o male admitted with COVID-19.   Blood cultures were 2/2 positive for viridans strep.  Source unclear.  This pathogen is often seen in the mouth and in the GI tract.  Viridans strep bacteremia.  Repeat blood cultures negative.  Recommend 2 weeks of IV antibiotics.  Stop date would be April 25, 2020.

## 2020-04-15 NOTE — SUBJECTIVE & OBJECTIVE
Interval History: Patient now with oxygen saturation 100% on BiPAP 20-12 90% FiO2. On morphine and precedex gtt. DNR/DNI. Good UOP. CVP at 30 deg 7. Will add lasix if CVP increases appreciably.    ROS  Objective:     Vital Signs (Most Recent):  Temp: 99.6 °F (37.6 °C) (04/15/20 0800)  Pulse: 104 (04/15/20 0900)  Resp: (!) 22 (04/15/20 0900)  BP: 92/70 (04/15/20 0900)  SpO2: 99 % (04/15/20 0900) Vital Signs (24h Range):  Temp:  [98.1 °F (36.7 °C)-100.5 °F (38.1 °C)] 99.6 °F (37.6 °C)  Pulse:  [] 104  Resp:  [19-38] 22  SpO2:  [83 %-100 %] 99 %  BP: (81-94)/(58-70) 92/70     Weight: 85.4 kg (188 lb 4.4 oz)  Body mass index is 27.01 kg/m².     SpO2: 99 %  O2 Device (Oxygen Therapy): BiPAP      Intake/Output Summary (Last 24 hours) at 4/15/2020 0919  Last data filed at 4/15/2020 0900  Gross per 24 hour   Intake 1384.72 ml   Output 1600 ml   Net -215.28 ml       Lines/Drains/Airways     Peripherally Inserted Central Catheter Line            PICC Triple Lumen 04/11/20 1245 right basilic 3 days          Drain                 NG/OG Tube 04/11/20 1232 16 Fr. Right nostril 3 days         Urethral Catheter 04/11/20 1000 3 days          Peripheral Intravenous Line                 Peripheral IV - Single Lumen 04/10/20 0240 20 G Right;Distal Forearm 5 days         Peripheral IV - Single Lumen 04/11/20 1102 18 G Left;Posterior Hand 3 days                Physical Exam     Limited review of systems and physical exam per team attending and/or nursing staff due to patient being in special isolation.       Significant Labs: All pertinent lab results from the last 24 hours have been reviewed.    Significant Imaging: EKG: QTc 460

## 2020-04-15 NOTE — HOSPITAL COURSE
Patient presented on 04/10/2020 to [facility] from [facility] in [place] with cc of fever. The facility noted Tmax (101.4) in addition noted hypoxic stats 88% on RA.  Admitted to inpatient service 4/11/2020 for supportive care. Started on rocephin and azithromycin for CAP coverage. Course of his admission patient became very agitated with his baseline TBI on the floor requiring restraints and Zyprexa / Ativan. Patient declined from a respiratory standpoint with hypoxia despite being on a 100% ventimask stats 50-80%, he was transferred to ICU on 4/11 for acute hypoxic respiratory failure and agitation. Also had persistent fevers, TMAX 102.7, blood cultures ++Strep. Viridans 4/10, started on Vancomycin on 4/11 and repeat blood cultures x2.  Right brachial PICC line was placed 04/11/2020.   4/12/2020: He was started on azithro/plaquenil on 4/12.   4/13/2020:  ID changed his ceftriaxone from 1 to 2 gm daily for the viridans strep.  He is currently unable to be fed d/t the risk for aspiration while on BiPAP.  His inflammatory markers are elevated.    4/14/2020: Patient on BiPAP on FiO2 100% 18/8 oxygen saturation in mid-upper 80s. CVP 14. Started lasix 40mg BID for added diuresis. Good urine output. Net negative 950 past 24 hrs. Remains on morphine gtt for comfort. Precedex for sedation. On levophed 0.05. S. viridans on BC. On vancomycin, azithromycin, ceftriaxone. On plaquenil, QTc 460. Spoke with wife and updated her on patient's condition. Patient is DNR/DNI.  4/15: Patient now with oxygen saturation 100% on BiPAP 20-12 90% FiO2. On morphine and precedex gtt. DNR/DNI. Good UOP. CVP at 30. PRN lasix for change in CVP.  4/16: Patient decompensated overnight. Now O2 sats 85. BP 55/34. Brother and son notified, who confirmed comfort care focused treatment approach. Unable to reach wife (voicemail full). Multiple attempts. Levo off. Morphine at 4 for comfort. Precedex at 1.4 for sedation.    4/16: Patient on  comfort measures and  with time of death declared at 1652 by Dr. Galeas. Spouse notified.

## 2020-04-15 NOTE — PROGRESS NOTES
[facility]  Cardiology  Progress Note    Patient Name: Bossman Wheeler  MRN: [id number]  Admission Date: 4/10/2020  Hospital Length of Stay: 5 days  Code Status: DNR   Attending Physician: Axel Noel MD   Primary Care Physician: Primary Doctor No  Expected Discharge Date:   Principal Problem:COVID-19 virus infection    Subjective:     Hospital Course:   Patient presented on 04/10/2020 to [facility] ED from [facility] in [place] with cc of fever. The facility noted Tmax (101.4) in addition noted hypoxic stats 88% on RA.  Admitted to inpatient service 4/11/2020 for supportive care. Started on rocephin and azithromycin for CAP coverage. Course of his admission patient became very agitated with his baseline TBI on the floor requiring restraints and Zyprexa / Ativan. Patient declined from a respiratory standpoint with hypoxia despite being on a 100% ventimask stats 50-80%, he was transferred to ICU on 4/11 for acute hypoxic respiratory failure and agitation. Also had persistent fevers, TMAX 102.7, blood cultures ++Strep. Viridans 4/10, started on Vancomycin on 4/11 and repeat blood cultures x2.  Right brachial PICC line was placed 04/11/2020.   4/12/2020: He was started on azithro/plaquenil on 4/12.   4/13/2020:  ID changed his ceftriaxone from 1 to 2 gm daily for the viridans strep.  He is currently unable to be fed d/t the risk for aspiration while on BiPAP.  His inflammatory markers are elevated.    4/14/2020: Patient on BiPAP on FiO2 100% 18/8 oxygen saturation in mid-upper 80s. CVP 14. Started lasix 40mg BID for added diuresis. Good urine output. Net negative 950 past 24 hrs. Remains on morphine gtt for comfort. Precedex for sedation. On levophed 0.05. S. viridans on BC. On vancomycin, azithromycin, ceftriaxone. On plaquenil, QTc 460. Spoke with wife and updated her on patient's condition. Patient is DNR/DNI.    Interval History: Patient now with oxygen saturation 100% on  BiPAP 20-12 90% FiO2. On morphine and precedex gtt. DNR/DNI. Good UOP. CVP at 30 deg 7. Will add lasix if CVP increases appreciably.    ROS  Objective:     Vital Signs (Most Recent):  Temp: 99.6 °F (37.6 °C) (04/15/20 0800)  Pulse: 104 (04/15/20 0900)  Resp: (!) 22 (04/15/20 0900)  BP: 92/70 (04/15/20 0900)  SpO2: 99 % (04/15/20 0900) Vital Signs (24h Range):  Temp:  [98.1 °F (36.7 °C)-100.5 °F (38.1 °C)] 99.6 °F (37.6 °C)  Pulse:  [] 104  Resp:  [19-38] 22  SpO2:  [83 %-100 %] 99 %  BP: (81-94)/(58-70) 92/70     Weight: 85.4 kg (188 lb 4.4 oz)  Body mass index is 27.01 kg/m².     SpO2: 99 %  O2 Device (Oxygen Therapy): BiPAP      Intake/Output Summary (Last 24 hours) at 4/15/2020 0919  Last data filed at 4/15/2020 0900  Gross per 24 hour   Intake 1384.72 ml   Output 1600 ml   Net -215.28 ml       Lines/Drains/Airways     Peripherally Inserted Central Catheter Line            PICC Triple Lumen 04/11/20 1245 right basilic 3 days          Drain                 NG/OG Tube 04/11/20 1232 16 Fr. Right nostril 3 days         Urethral Catheter 04/11/20 1000 3 days          Peripheral Intravenous Line                 Peripheral IV - Single Lumen 04/10/20 0240 20 G Right;Distal Forearm 5 days         Peripheral IV - Single Lumen 04/11/20 1102 18 G Left;Posterior Hand 3 days                Physical Exam     Limited review of systems and physical exam per team attending and/or nursing staff due to patient being in special isolation.       Significant Labs: All pertinent lab results from the last 24 hours have been reviewed.    Significant Imaging: EKG: QTc 460    Assessment and Plan:     Brief HPI: Bossman Wheeler was admitted to ICU 4/12 for management of respiratory failure secondary to suspected and/or confirmed COVID-19 requiring mechanical ventilation for respiratory support.      Neuro:   -Currently on BiPAP and sedated on morphine gtt for comfort. Arouses to voice.  -Precedex for sedation.  -Patient  DNR/DNI     Cardiac/Circulatory:   -PICC line placed 4/11.   -EF 55% by TTE 4/12/2020  -On levophed gtt titrated to goal of MAP >65.     Pulmonary:   -Continue BiPAP with ARDS targeted strategies.   -Continue closed ventilator circuit and avoid aerosol generating procedures.   -COVID-19 positive 4/10.   -PLAQUENIL and azithromycin started 4/12. Last dose  Plaquenil today 4/15/2020. QTc on 4/12 502 ms and QTc 4/13/2020 is 461  -Daily CRP, d-dimer, ferritin, and CK-MB to trend inflammatory response. Ferritin 1077>1148.  -Strict I/O's and goal net neutral status to help optimize respiratory status  -Lasix added to regimen as needed to help decrease oxygen demand  -Special isolation and aspiration precautions ordered.   -Update family daily.      Renal:   -Monitoring urine output   -Trending renal function daily. Creatinine 1.2.  -Strict I/O's.   -1140 urine out 4/14/2020.   -ionized calcium 0.89 - replaced with 2g calcium gluconate     Gastrointestinal:   -Trending liver function daily. AST 70>72. ALT 44>36.  -Can not feed on BiPAP d/t high risk for aspiration     Endocrine:   -Hold home oral diabetic agents.   -Goal glucose 140-180. .  -POCT glucose checks Q6H while NPO.   -Sliding scale insulin and/or insulin drip ordered, if necessary for goal.      ID:   -Blood cx from 4/10 positive for viridans streptococcus.  On ceftriaxone.  -BCx2 drawn 4/14/2020. NGTD.  -4/14/2020: Ferritin 940>1077, D dimer 2.21 and >294  -WBC 22.08>17.32  -Abx: Azithromycin, ceftriaxone     Hematology/Oncology:   -Trending CBC daily and monitor for signs of bleeding.   - H/H today 11.2/36.1.  Transfuse for Hgb <7.     VTE Risk Mitigation (From admission, onward)         Ordered     enoxaparin injection 40 mg  Daily      04/10/20 1329     IP VTE LOW RISK PATIENT  Once      04/10/20 0610     Place PRAVEENA hose  Until discontinued      04/10/20 0610     Place sequential compression device  Until discontinued      04/10/20 0610                 Mary Phipps NP  Cardiology  [facility]  STAFF MD: Shira Galeas MD

## 2020-04-16 NOTE — PROGRESS NOTES
Notified Dr. Rueda @ 2741 of deterioration of clinical status after multiple interventions implemented.New orders obtained. Dr. Rueda attempted x's 2, to notify spouse of deterioration in status, spouse did not answer. Notified brother, Stanford Wheeler @ 0421, regarding deterioration in clinical status. Brother agreed to make pt complete comfort care with the acknowledgement that medical personnel attempted twice to notify spouse. Notified mother, Li Wheeler of deterioration of clinical status, mother agreed with comfort care measures. Levophed off @ 0435 am per MD order, patient placed on NRB. Will notify family when patient has passed. Pt resting with eyes closed, no signs and symptoms of agitation noted at this time. Will continue to monitor

## 2020-04-16 NOTE — PLAN OF CARE
Interval update:     Nursing called stating patient tachypnea RR 40-50, Tachycardic , stats <70% on 100% fio2 BiPAP 18/10. Stat ABG with PH 7.4 / 40 / 44 on 100%. Patient is DNR / DNI / comfort measures. Multiple attempts made to reach spouse, which were unsuccessful. Case discussed with patients brother / mother, they are aware of his critical illness and agree with moving forward with comfort measures only, discussion witnessed by nursing. BiPAP discontinued, placed FM. Dc Levophed. Okay with morphine / precedex ggt.     Will attempt to reach spouse again       Esther Rueda M.D.  Page # [phone]  Cardiovascular Fellow PGY-V  [facility]

## 2020-04-16 NOTE — SUBJECTIVE & OBJECTIVE
Interval History: Patient decompensated overnight. Now O2 sats 85. BP 55/34. Brother and son notified, who confirmed comfort care focused treatment approach. Unable to reach wife (voicemail full). Multiple attempts. Levo off. Morphine at 4 for comfort. Precedex at 1.4 for sedation.      ROS  Objective:     Vital Signs (Most Recent):  Temp: 97.6 °F (36.4 °C) (04/15/20 2300)  Pulse: 76 (04/16/20 0721)  Resp: (!) 25 (04/16/20 0721)  BP: (!) 55/34 (04/16/20 0700)  SpO2: (!) 85 % (04/16/20 0721) Vital Signs (24h Range):  Temp:  [97.3 °F (36.3 °C)-99 °F (37.2 °C)] 97.6 °F (36.4 °C)  Pulse:  [] 76  Resp:  [20-50] 25  SpO2:  [74 %-100 %] 85 %  BP: ()/(34-88) 55/34     Weight: 85.4 kg (188 lb 4.4 oz)  Body mass index is 27.01 kg/m².     SpO2: (!) 85 %  O2 Device (Oxygen Therapy): Non Rebreather Mask      Intake/Output Summary (Last 24 hours) at 4/16/2020 0830  Last data filed at 4/16/2020 0700  Gross per 24 hour   Intake 891.08 ml   Output 1560 ml   Net -668.92 ml       Lines/Drains/Airways     Peripherally Inserted Central Catheter Line            PICC Triple Lumen 04/11/20 1245 right basilic 4 days          Drain                 NG/OG Tube 04/11/20 1232 16 Fr. Right nostril 4 days         Urethral Catheter 04/11/20 1000 4 days          Peripheral Intravenous Line                 Peripheral IV - Single Lumen 04/10/20 0240 20 G Right;Distal Forearm 6 days         Peripheral IV - Single Lumen 04/11/20 1102 18 G Left;Posterior Hand 4 days                Physical Exam     Limited review of systems and physical exam per team attending and/or nursing staff due to patient being in special isolation.       Significant Labs: All pertinent lab results from the last 24 hours have been reviewed.    Significant Imaging: Relevant recent imaging reviewed.

## 2020-04-16 NOTE — PLAN OF CARE
04/16/20 1136   Discharge Reassessment   Assessment Type Discharge Planning Reassessment   Do you have any problems affording any of your prescribed medications? No   Discharge Plan A Inpatient Hospice   Discharge Plan B Hospice/home   DME Needed Upon Discharge  none   Anticipated Discharge Disposition [facility]   Can the patient/caregiver answer the patient profile reliably? No, cognitively impaired     Grace Julio, MARYSE Case Management

## 2020-04-16 NOTE — PROGRESS NOTES
Notified Dr. Rueda @ 5618 of deterioration of clinical status after multiple interventions implemented.New orders obtained. Dr. Rueda attempted x's 2, to notify spouse of deterioration in status, spouse did not answer. Notified brother, Stanford Wheeler @ 0421, regarding deterioration in clinical status. Brother agreed to make pt complete comfort care with the acknowledgement that medical personnel attempted twice to notify spouse. Notified mother, Li Wheeler of deterioration of clinical status, mother agreed with comfort care measures. Levophed off @ 0435 am per MD order, patient placed on NRB. Will notify family when patient has passed. Pt resting with eyes closed, no signs and symptoms of agitation noted at this time. Will continue to monitor.

## 2020-04-16 NOTE — RESPIRATORY THERAPY
Patient placed on NRM per Mohit LYLE for comfort care measures. Mohit LYLE notified for ABG results at 0425.

## 2020-04-16 NOTE — SIGNIFICANT EVENT
Patient on comfort measures and  with time of death declared at 1652 by Dr. Galeas. Spouse notified. She says will notify the hospital when she has selected a  home. Brother notified. He said he would like to notify his mother himself vs the hospital. All questions answered.

## 2020-04-16 NOTE — CARE UPDATE
Attempted to call spouse to notify her of deterioration in patient's clinical status. Voicemail is full. Will continue to make attempts to reach her. Brother and mother have been informed.    ADDENDUM: Did contact spouse at 1130 and informed her of patient's condition. All questions answered and she agrees with comfort plan, continued DNR/DNI. Will notify her if any change in condition otherwise update later today.

## 2020-04-16 NOTE — PROGRESS NOTES
[facility]  Cardiology  Progress Note    Patient Name: Bossman Wheeler  MRN: [id number]  Admission Date: 4/10/2020  Hospital Length of Stay: 6 days  Code Status: DNR   Attending Physician: Axel Noel MD   Primary Care Physician: Primary Doctor No  Expected Discharge Date: 4/22/2020  Principal Problem:COVID-19 virus infection    Subjective:     Hospital Course:   Patient presented on 04/10/2020 to [facility] from [facility] in [place] with cc of fever. The facility noted Tmax (101.4) in addition noted hypoxic stats 88% on RA.  Admitted to inpatient service 4/11/2020 for supportive care. Started on rocephin and azithromycin for CAP coverage. Course of his admission patient became very agitated with his baseline TBI on the floor requiring restraints and Zyprexa / Ativan. Patient declined from a respiratory standpoint with hypoxia despite being on a 100% ventimask stats 50-80%, he was transferred to ICU on 4/11 for acute hypoxic respiratory failure and agitation. Also had persistent fevers, TMAX 102.7, blood cultures ++Strep. Viridans 4/10, started on Vancomycin on 4/11 and repeat blood cultures x2.  Right brachial PICC line was placed 04/11/2020.   4/12/2020: He was started on azithro/plaquenil on 4/12.   4/13/2020:  ID changed his ceftriaxone from 1 to 2 gm daily for the viridans strep.  He is currently unable to be fed d/t the risk for aspiration while on BiPAP.  His inflammatory markers are elevated.    4/14/2020: Patient on BiPAP on FiO2 100% 18/8 oxygen saturation in mid-upper 80s. CVP 14. Started lasix 40mg BID for added diuresis. Good urine output. Net negative 950 past 24 hrs. Remains on morphine gtt for comfort. Precedex for sedation. On levophed 0.05. S. viridans on BC. On vancomycin, azithromycin, ceftriaxone. On plaquenil, QTc 460. Spoke with wife and updated her on patient's condition. Patient is DNR/DNI.  4/15: Patient now with oxygen saturation 100% on BiPAP  20-12 90% FiO2. On morphine and precedex gtt. DNR/DNI. Good UOP. CVP at 30. PRN lasix for change in CVP.  4/16: Patient decompensated overnight. Now O2 sats 85. BP 55/34. Brother and son notified, who confirmed comfort care focused treatment approach. Unable to reach wife (voicemail full). Multiple attempts. Levo off. Morphine at 4 for comfort. Precedex at 1.4 for sedation.      Interval History: Patient decompensated overnight around 0300 this morning with sats 85% on BiPAP FiO2 100%. Brother and son notified, who confirmed comfort care focused treatment approach. Unable to reach wife (voicemail full). Multiple attempts made - will continue to make attempts.  Now O2 sats 85 on NRB 15L. BP 55/34. Levo now off. Morphine at 4 for comfort. Precedex at 1.4 for sedation.      ROS  Objective:     Vital Signs (Most Recent):  Temp: 97.6 °F (36.4 °C) (04/15/20 2300)  Pulse: 76 (04/16/20 0721)  Resp: (!) 25 (04/16/20 0721)  BP: (!) 55/34 (04/16/20 0700)  SpO2: (!) 85 % (04/16/20 0721) Vital Signs (24h Range):  Temp:  [97.3 °F (36.3 °C)-99 °F (37.2 °C)] 97.6 °F (36.4 °C)  Pulse:  [] 76  Resp:  [20-50] 25  SpO2:  [74 %-100 %] 85 %  BP: ()/(34-88) 55/34     Weight: 85.4 kg (188 lb 4.4 oz)  Body mass index is 27.01 kg/m².     SpO2: (!) 85 %  O2 Device (Oxygen Therapy): Non Rebreather Mask      Intake/Output Summary (Last 24 hours) at 4/16/2020 0830  Last data filed at 4/16/2020 0700  Gross per 24 hour   Intake 891.08 ml   Output 1560 ml   Net -668.92 ml       Lines/Drains/Airways     Peripherally Inserted Central Catheter Line            PICC Triple Lumen 04/11/20 1245 right basilic 4 days          Drain                 NG/OG Tube 04/11/20 1232 16 Fr. Right nostril 4 days         Urethral Catheter 04/11/20 1000 4 days          Peripheral Intravenous Line                 Peripheral IV - Single Lumen 04/10/20 0240 20 G Right;Distal Forearm 6 days         Peripheral IV - Single Lumen 04/11/20 1102 18 G Left;Posterior  Hand 4 days                Physical Exam     Limited review of systems and physical exam per team attending and/or nursing staff due to patient being in special isolation.       Significant Labs: All pertinent lab results from the last 24 hours have been reviewed.    Significant Imaging: Relevant recent imaging reviewed.    Assessment and Plan:     Brief HPI: Bossman Wheeler was admitted to ICU 4/12 for management of respiratory failure secondary to suspected and/or confirmed COVID-19 requiring mechanical ventilation for respiratory support.      Neuro:   -Currently on NRB 15L. Or sats 85%. Morphine gtt now at 4 for comfort..  -Precedex for sedation.  -Patient DNR/DNI  -Brother and son notified of patient's deteriorating condition. Continue to attempt to reach spouse (voicemail full - will keep calling).     Cardiac/Circulatory:   -PICC line placed 4/11.   -EF 55% by TTE 4/12/2020  -Levo gtt OFF.     Pulmonary:   -Switched from BiPAP to NRB for comfort.  -COVID-19 positive 4/10.   -PLAQUENIL and azithromycin started 4/12. Doses completed 4/15/2020.   -CRP, d-dimer, ferritin, and CK-MB to trend inflammatory response.  -Ferritin 1077>1148>1616.  -Strict I/O's and goal net neutral status to help optimize respiratory status  -Lasix added to regimen as needed to help decrease oxygen demand  -Special isolation and aspiration precautions ordered.   -Update family frequently.     Renal:   -Monitoring urine output   -Trending renal function daily. Creatinine 1.3.  -Strict I/O's.   -Net -778 4/15/2020.      Gastrointestinal:   -Trending liver function daily. AST 72>71. ALT 36>31.     Endocrine:   -Hold home oral diabetic agents.   -Goal glucose 140-180. .  -POCT glucose checks Q6H while NPO.   -Sliding scale insulin and/or insulin drip ordered, if necessary for goal.      ID:   -Blood cx from 4/10 positive for viridans streptococcus.  On ceftriaxone.  -BCx2 drawn 4/14/2020. NGTD.  ->400  -WBC  22.08>17.32>18.45  -Abx: ceftriaxone     Hematology/Oncology:   -Trending CBC daily and monitor for signs of bleeding.   - H/H today 12.1/39.9.      VTE Risk Mitigation (From admission, onward)         Ordered     enoxaparin injection 40 mg  Daily      04/10/20 1329     IP VTE LOW RISK PATIENT  Once      04/10/20 0610     Place PRAVEENA hose  Until discontinued      04/10/20 0610     Place sequential compression device  Until discontinued      04/10/20 0610                Mary Phipps NP  Cardiology  [facility]  STAFF MD: Shira Glaeas MD

## 2020-04-17 NOTE — PLAN OF CARE
20 0654   Final Note   Assessment Type Final Discharge Note   Anticipated Discharge Disposition

## 2020-04-21 LAB
BACTERIA BLD CULT: ABNORMAL

## 2020-04-30 NOTE — DISCHARGE SUMMARY
[facility]  Cardiology  Discharge Summary      Patient Name: Bossman Wheeler  MRN: [id number]  Admission Date: 4/10/2020  Hospital Length of Stay: 6 days  Discharge Date and Time: 4/16/2020     Attending Physician: RAZIA EM MD  Discharging Provider: Mary Phipps NP  Primary Care Physician: Primary Doctor No    HPI:   Principal Problem: COVID-19 virus infection.    Bossman Wheeler is a 60 y.o. Male, transferred from  for AHRF in the setting of COVID infection. PMhx of Hep C, Hep B, HTN, Dementia, and TBI who presented on 04/10/2020 to [facility] from [facility] in [place] with cc of fever. The facility noted Tmax (101.4) in addition noted hypoxic stats 88% on RA. At [facility], found to be COVID-19 POSITIVE. Admitted to inpatient service for supportive care. Started on rocephin and azithromycin started for CAP coverage. Course of his patient patient became very agitated with his baseline TBI on the floor requiring restraints and Zyprexa / Ativan. Patient declined from a respiratory standpoint with hypoxia despite being on a 100% ventimask stats 50-80%, he was transferred to ICU on 4/11 for acute hypoxic respiratory failure and agitation. Also had persistent fevers, TMAX 102.7, blood cultures ++Strep. Viridans 4/10, started on Vancomycin on 4/11 and repeat blood cultures x2.  Right brachial PICC line was placed 04/11/2020.    Off note, on admission patient had mildly elevated troponin (trop 0.078) with sinus tach on EKG, Echo EF 55%, CVP 3mmHg, PASP 24mmHg, moderate TR.     04/11/2020 CXR: There is increased airspace consolidation noted within the right midlung zone.  There is also some slight increase in parenchymal opacification in the perihilar region on the left.     Lactic acid 1.4 /  /  / Cr 1.3   BCx: 1 set (04/10/2020) Strep viridans   BCx: 2 set (04/11/2020) NGTD      Patient's Spouse: Rose Mary Wheeler [phone]          * No surgery found  *     Indwelling Lines/Drains at time of discharge:  Lines/Drains/Airways     Peripherally Inserted Central Catheter Line            PICC Triple Lumen 04/11/20 1245 right basilic 19 days          Drain                 Urethral Catheter 04/11/20 1000 19 days                Hospital Course:  Patient presented on 04/10/2020 to [facility] from [facility] in [place] with cc of fever. The facility noted Tmax (101.4) in addition noted hypoxic stats 88% on RA.  Admitted to inpatient service 4/11/2020 for supportive care. Started on rocephin and azithromycin for CAP coverage. Course of his admission patient became very agitated with his baseline TBI on the floor requiring restraints and Zyprexa / Ativan. Patient declined from a respiratory standpoint with hypoxia despite being on a 100% ventimask stats 50-80%, he was transferred to ICU on 4/11 for acute hypoxic respiratory failure and agitation. Also had persistent fevers, TMAX 102.7, blood cultures ++Strep. Viridans 4/10, started on Vancomycin on 4/11 and repeat blood cultures x2.  Right brachial PICC line was placed 04/11/2020.   4/12/2020: He was started on azithro/plaquenil on 4/12.   4/13/2020:  ID changed his ceftriaxone from 1 to 2 gm daily for the viridans strep.  He is currently unable to be fed d/t the risk for aspiration while on BiPAP.  His inflammatory markers are elevated.    4/14/2020: Patient on BiPAP on FiO2 100% 18/8 oxygen saturation in mid-upper 80s. CVP 14. Started lasix 40mg BID for added diuresis. Good urine output. Net negative 950 past 24 hrs. Remains on morphine gtt for comfort. Precedex for sedation. On levophed 0.05. S. viridans on BC. On vancomycin, azithromycin, ceftriaxone. On plaquenil, QTc 460. Spoke with wife and updated her on patient's condition. Patient is DNR/DNI.  4/15: Patient now with oxygen saturation 100% on BiPAP 20-12 90% FiO2. On morphine and precedex gtt. DNR/DNI. Good UOP. CVP at 30. PRN lasix for change in  CVP.  : Patient decompensated overnight. Now O2 sats 85. BP 55/34. Brother and son notified, who confirmed comfort care focused treatment approach. Unable to reach wife (voicemail full). Multiple attempts. Levo off. Morphine at 4 for comfort. Precedex at 1.4 for sedation.    : Patient on comfort measures and  with time of death declared at 1652 by Dr. Galeas. Spouse notified.      Final Active Diagnoses:    Diagnosis Date Noted POA    PRINCIPAL PROBLEM:  COVID-19 virus infection [U07.1] 04/10/2020 Yes    Septic shock [A41.9, R65.21] 2020 Yes    Acute hypoxemic respiratory failure [J96.01] 2020 Yes    Agitation [R45.1] 2020 Yes    Bacteremia [R78.81] 2020 Yes    Anemia of chronic disease [D63.8] 2020 Yes    Chronic hepatitis C [B18.2] 04/10/2020 Yes    Hyperlipidemia [E78.5] 04/10/2020 Yes    Essential hypertension [I10] 07/15/2016 Yes    Dementia [F03.90] 2016 Yes      Problems Resolved During this Admission:     No new Assessment & Plan notes have been filed under this hospital service since the last note was generated.  Service: Cardiology      Discharged Condition:     Disposition:  in Medical Facility    Follow Up:    Patient Instructions:   No discharge procedures on file.  Medications:  None (patient  at medical facility)    Time spent on the discharge of patient: 40 minutes    Mary Phipps NP  Cardiology  [facility]

## 2020-05-01 NOTE — PHYSICIAN QUERY
PT Name: Bossman Wheeler  MR #: [id number]     Physician Query Form - Documentation Clarification      CDS/: Fe Chapin RN CDI      Contact information: [email or web address]    This form is a permanent document in the medical record.     Query Date: May 1, 2020    By submitting this query, we are merely seeking further clarification of documentation. Please utilize your independent clinical judgment when addressing the question(s) below.    The Medical Record reflects the following:    Clinical Findings Location in Medical Record   Patient presented on 04/10/2020 to [facility] from [facility] in [place] with cc of fever. The facility noted Tmax (101.4) in addition noted hypoxic stats 88% on RA.  Admitted to inpatient service 4/11/2020 for supportive care. Started on rocephin and azithromycin for CAP coverage.   Patient declined from a respiratory standpoint with hypoxia despite being on a 100% ventimask stats 50-80%, he was transferred to ICU on 4/11 for acute hypoxic respiratory failure and agitation. Also had persistent fevers, TMAX 102.7, blood cultures ++Strep. Viridans 4/10, started on Vancomycin on 4/11 and repeat blood cultures x2.     Final active problem list:  Septic shock  Bacteremia  Covid-19 virus infection Discharge summary  4/16 748 pm  CATARINA Phipps NP / S Gudelia LYLE               WBC       CRP      Lactate    Procal  4/10 = 9.38         113.5        104          0.20  4/12 = 10.45       294.2  4/13 = 16.31       429.9  4/14 = 22.08  4/15 = 17.32  4/16 = 18.45       400.5    Blood culture 4/10 VIRIDANS STREPTOCOCCUS   Blood culture 4/11 x2 no growth    Normal saline bolus 1905 ml IV in Er  Norepinephrine drip 4/12 - 4/16    Azithromycin 250 po daily 4/10 and 4/11 - 4/15 x5 doses  Hydroxychloroquine 400 mg po 4 doses 4/12  Vancomycin 1.25 g IV x1  4/11  Vancomycin 1750 mg IV 4/12-4/13  Vancomycin 1500 mg IV 4/14  Ceftriaxone 1 g IV 4/10-4/11  Ceftriaxone 2 g IV 4/12 - 4/16    Lab results                      Medication sheets                                                                            Please clarify the present on admit status of Septic shock including the etiology. Ruiz all that apply.      Provider Use Only      Sepsis with septic shock;                   Specify the present on admit status of Shock;                                           (  ) Yes                                           ( x ) No                                           (  ) Undetermined                    Specify the present on admit status of Sepsis;                                           (x  ) Yes                                           (  ) No                                           (  ) Undetermined                     Specify the etiology of sepsis;                                             (  ) Covid-19 viral infection                                           (  ) Bacteremia due to Strep. Viridans                                           ( x ) Both Covid-19 viral infection and Bacteremia due to Strep. Viridans                                           (  ) Other, specify_____________.                                           (  ) Undetermined                         [  ] Other clarification (specify): ______________        [  ] Clinically undetermined